# Patient Record
Sex: MALE | Race: WHITE | NOT HISPANIC OR LATINO | Employment: OTHER | URBAN - METROPOLITAN AREA
[De-identification: names, ages, dates, MRNs, and addresses within clinical notes are randomized per-mention and may not be internally consistent; named-entity substitution may affect disease eponyms.]

---

## 2023-09-13 ENCOUNTER — APPOINTMENT (OUTPATIENT)
Dept: OTHER | Facility: HOSPITAL | Age: 88
End: 2023-09-13

## 2023-09-13 ENCOUNTER — HOSPITAL ENCOUNTER (INPATIENT)
Facility: HOSPITAL | Age: 88
LOS: 3 days | Discharge: HOME | End: 2023-09-16
Attending: EMERGENCY MEDICINE | Admitting: INTERNAL MEDICINE

## 2023-09-13 DIAGNOSIS — Z74.09 IMPAIRED FUNCTIONAL MOBILITY, BALANCE, GAIT, AND ENDURANCE: ICD-10-CM

## 2023-09-13 DIAGNOSIS — R31.9 URINARY TRACT INFECTION WITH HEMATURIA, SITE UNSPECIFIED: ICD-10-CM

## 2023-09-13 DIAGNOSIS — N39.0 URINARY TRACT INFECTION WITH HEMATURIA, SITE UNSPECIFIED: ICD-10-CM

## 2023-09-13 DIAGNOSIS — R33.9 URINARY RETENTION: Primary | ICD-10-CM

## 2023-09-13 DIAGNOSIS — N28.89 RIGHT RENAL MASS: ICD-10-CM

## 2023-09-13 DIAGNOSIS — N17.9 ACUTE RENAL FAILURE, UNSPECIFIED ACUTE RENAL FAILURE TYPE: ICD-10-CM

## 2023-09-13 DIAGNOSIS — N28.89 RENAL MASS, RIGHT: ICD-10-CM

## 2023-09-13 LAB
ALBUMIN SERPL-MCNC: 3.6 G/DL (ref 3.5–5.2)
ALBUMIN/GLOB SERPL: 1.1 G/DL
ALP SERPL-CCNC: 145 U/L (ref 39–117)
ALT SERPL W P-5'-P-CCNC: 14 U/L (ref 1–41)
ANION GAP SERPL CALCULATED.3IONS-SCNC: 16 MMOL/L (ref 5–15)
ANION GAP SERPL CALCULATED.3IONS-SCNC: 17 MMOL/L (ref 5–15)
AST SERPL-CCNC: 20 U/L (ref 1–40)
BACTERIA UR QL AUTO: ABNORMAL /HPF
BASOPHILS # BLD AUTO: 0.04 10*3/MM3 (ref 0–0.2)
BASOPHILS NFR BLD AUTO: 0.3 % (ref 0–1.5)
BILIRUB SERPL-MCNC: 0.3 MG/DL (ref 0–1.2)
BILIRUB UR QL STRIP: NEGATIVE
BUN SERPL-MCNC: 71 MG/DL (ref 8–23)
BUN SERPL-MCNC: 73 MG/DL (ref 8–23)
BUN/CREAT SERPL: 12.8 (ref 7–25)
BUN/CREAT SERPL: 13.9 (ref 7–25)
CALCIUM SPEC-SCNC: 9.1 MG/DL (ref 8.2–9.6)
CALCIUM SPEC-SCNC: 9.4 MG/DL (ref 8.2–9.6)
CHLORIDE SERPL-SCNC: 84 MMOL/L (ref 98–107)
CHLORIDE SERPL-SCNC: 88 MMOL/L (ref 98–107)
CLARITY UR: ABNORMAL
CO2 SERPL-SCNC: 22 MMOL/L (ref 22–29)
CO2 SERPL-SCNC: 24 MMOL/L (ref 22–29)
COLOR UR: YELLOW
CREAT SERPL-MCNC: 5.1 MG/DL (ref 0.76–1.27)
CREAT SERPL-MCNC: 5.7 MG/DL (ref 0.76–1.27)
D-LACTATE SERPL-SCNC: 1.8 MMOL/L (ref 0.5–2)
DEPRECATED RDW RBC AUTO: 50.8 FL (ref 37–54)
EGFRCR SERPLBLD CKD-EPI 2021: 8.6 ML/MIN/1.73
EGFRCR SERPLBLD CKD-EPI 2021: 9.9 ML/MIN/1.73
EOSINOPHIL # BLD AUTO: 0.1 10*3/MM3 (ref 0–0.4)
EOSINOPHIL NFR BLD AUTO: 0.7 % (ref 0.3–6.2)
ERYTHROCYTE [DISTWIDTH] IN BLOOD BY AUTOMATED COUNT: 16.1 % (ref 12.3–15.4)
GLOBULIN UR ELPH-MCNC: 3.3 GM/DL
GLUCOSE SERPL-MCNC: 115 MG/DL (ref 65–99)
GLUCOSE SERPL-MCNC: 141 MG/DL (ref 65–99)
GLUCOSE UR STRIP-MCNC: NEGATIVE MG/DL
HCT VFR BLD AUTO: 33.4 % (ref 37.5–51)
HGB BLD-MCNC: 10.8 G/DL (ref 13–17.7)
HGB UR QL STRIP.AUTO: ABNORMAL
HYALINE CASTS UR QL AUTO: ABNORMAL /LPF
IMM GRANULOCYTES # BLD AUTO: 0.07 10*3/MM3 (ref 0–0.05)
IMM GRANULOCYTES NFR BLD AUTO: 0.5 % (ref 0–0.5)
KETONES UR QL STRIP: NEGATIVE
LEUKOCYTE ESTERASE UR QL STRIP.AUTO: ABNORMAL
LYMPHOCYTES # BLD AUTO: 0.74 10*3/MM3 (ref 0.7–3.1)
LYMPHOCYTES NFR BLD AUTO: 4.8 % (ref 19.6–45.3)
MCH RBC QN AUTO: 28.1 PG (ref 26.6–33)
MCHC RBC AUTO-ENTMCNC: 32.3 G/DL (ref 31.5–35.7)
MCV RBC AUTO: 87 FL (ref 79–97)
MONOCYTES # BLD AUTO: 0.9 10*3/MM3 (ref 0.1–0.9)
MONOCYTES NFR BLD AUTO: 5.9 % (ref 5–12)
NEUTROPHILS NFR BLD AUTO: 13.48 10*3/MM3 (ref 1.7–7)
NEUTROPHILS NFR BLD AUTO: 87.8 % (ref 42.7–76)
NITRITE UR QL STRIP: NEGATIVE
NRBC BLD AUTO-RTO: 0 /100 WBC (ref 0–0.2)
PH UR STRIP.AUTO: 5.5 [PH] (ref 5–8)
PLATELET # BLD AUTO: 321 10*3/MM3 (ref 140–450)
PMV BLD AUTO: 8.4 FL (ref 6–12)
POTASSIUM SERPL-SCNC: 5.4 MMOL/L (ref 3.5–5.2)
POTASSIUM SERPL-SCNC: 5.8 MMOL/L (ref 3.5–5.2)
PROT SERPL-MCNC: 6.9 G/DL (ref 6–8.5)
PROT UR QL STRIP: ABNORMAL
QT INTERVAL: 312 MS
QTC INTERVAL: 462 MS
RBC # BLD AUTO: 3.84 10*6/MM3 (ref 4.14–5.8)
RBC # UR STRIP: ABNORMAL /HPF
REF LAB TEST METHOD: ABNORMAL
SODIUM SERPL-SCNC: 125 MMOL/L (ref 136–145)
SODIUM SERPL-SCNC: 126 MMOL/L (ref 136–145)
SP GR UR STRIP: 1.02 (ref 1–1.03)
SQUAMOUS #/AREA URNS HPF: ABNORMAL /HPF
UROBILINOGEN UR QL STRIP: ABNORMAL
WBC # UR STRIP: ABNORMAL /HPF
WBC NRBC COR # BLD: 15.33 10*3/MM3 (ref 3.4–10.8)

## 2023-09-13 PROCEDURE — 87040 BLOOD CULTURE FOR BACTERIA: CPT | Performed by: EMERGENCY MEDICINE

## 2023-09-13 PROCEDURE — 51702 INSERT TEMP BLADDER CATH: CPT

## 2023-09-13 PROCEDURE — 25010000002 CEFTRIAXONE PER 250 MG: Performed by: EMERGENCY MEDICINE

## 2023-09-13 PROCEDURE — 94640 AIRWAY INHALATION TREATMENT: CPT

## 2023-09-13 PROCEDURE — 99285 EMERGENCY DEPT VISIT HI MDM: CPT

## 2023-09-13 PROCEDURE — 93005 ELECTROCARDIOGRAM TRACING: CPT | Performed by: EMERGENCY MEDICINE

## 2023-09-13 PROCEDURE — 81001 URINALYSIS AUTO W/SCOPE: CPT | Performed by: EMERGENCY MEDICINE

## 2023-09-13 PROCEDURE — 25010000002 CALCIUM GLUCONATE-NACL 1-0.675 GM/50ML-% SOLUTION: Performed by: INTERNAL MEDICINE

## 2023-09-13 PROCEDURE — 83605 ASSAY OF LACTIC ACID: CPT | Performed by: EMERGENCY MEDICINE

## 2023-09-13 PROCEDURE — 85025 COMPLETE CBC W/AUTO DIFF WBC: CPT | Performed by: EMERGENCY MEDICINE

## 2023-09-13 PROCEDURE — 63710000001 INSULIN REGULAR HUMAN PER 5 UNITS: Performed by: INTERNAL MEDICINE

## 2023-09-13 PROCEDURE — 87086 URINE CULTURE/COLONY COUNT: CPT | Performed by: EMERGENCY MEDICINE

## 2023-09-13 PROCEDURE — 80053 COMPREHEN METABOLIC PANEL: CPT | Performed by: EMERGENCY MEDICINE

## 2023-09-13 PROCEDURE — 36415 COLL VENOUS BLD VENIPUNCTURE: CPT

## 2023-09-13 RX ORDER — DEXTROSE MONOHYDRATE 25 G/50ML
25 INJECTION, SOLUTION INTRAVENOUS ONCE
Status: COMPLETED | OUTPATIENT
Start: 2023-09-13 | End: 2023-09-13

## 2023-09-13 RX ORDER — HYDROCODONE BITARTRATE AND ACETAMINOPHEN 5; 325 MG/1; MG/1
2 TABLET ORAL EVERY 6 HOURS PRN
COMMUNITY
Start: 2023-09-13

## 2023-09-13 RX ORDER — CALCIUM GLUCONATE 20 MG/ML
1000 INJECTION, SOLUTION INTRAVENOUS ONCE
Status: COMPLETED | OUTPATIENT
Start: 2023-09-13 | End: 2023-09-13

## 2023-09-13 RX ORDER — SODIUM CHLORIDE 0.9 % (FLUSH) 0.9 %
10 SYRINGE (ML) INJECTION AS NEEDED
Status: DISCONTINUED | OUTPATIENT
Start: 2023-09-13 | End: 2023-09-16 | Stop reason: HOSPADM

## 2023-09-13 RX ORDER — DONEPEZIL HYDROCHLORIDE 5 MG/1
5 TABLET, FILM COATED ORAL NIGHTLY
COMMUNITY
Start: 2023-07-07

## 2023-09-13 RX ORDER — LOSARTAN POTASSIUM 100 MG/1
100 TABLET ORAL DAILY
COMMUNITY
Start: 2023-07-21 | End: 2023-09-16 | Stop reason: HOSPADM

## 2023-09-13 RX ORDER — SODIUM CHLORIDE 9 MG/ML
125 INJECTION, SOLUTION INTRAVENOUS CONTINUOUS
Status: DISCONTINUED | OUTPATIENT
Start: 2023-09-13 | End: 2023-09-14 | Stop reason: SDUPTHER

## 2023-09-13 RX ORDER — MINOCYCLINE HYDROCHLORIDE 100 MG/1
100 CAPSULE ORAL DAILY
COMMUNITY
Start: 2023-09-06 | End: 2023-09-16 | Stop reason: HOSPADM

## 2023-09-13 RX ORDER — TAMSULOSIN HYDROCHLORIDE 0.4 MG/1
1 CAPSULE ORAL DAILY
COMMUNITY
Start: 2023-09-13

## 2023-09-13 RX ORDER — ALBUTEROL SULFATE 2.5 MG/3ML
5 SOLUTION RESPIRATORY (INHALATION) ONCE
Status: COMPLETED | OUTPATIENT
Start: 2023-09-13 | End: 2023-09-13

## 2023-09-13 RX ORDER — MEMANTINE HYDROCHLORIDE 10 MG/1
10 TABLET ORAL 2 TIMES DAILY
COMMUNITY
Start: 2023-06-23

## 2023-09-13 RX ADMIN — SODIUM CHLORIDE 500 ML: 9 INJECTION, SOLUTION INTRAVENOUS at 21:16

## 2023-09-13 RX ADMIN — ALBUTEROL SULFATE 5 MG: 2.5 SOLUTION RESPIRATORY (INHALATION) at 21:46

## 2023-09-13 RX ADMIN — SODIUM CHLORIDE 125 ML/HR: 9 INJECTION, SOLUTION INTRAVENOUS at 21:23

## 2023-09-13 RX ADMIN — SODIUM CHLORIDE 1000 MG: 900 INJECTION INTRAVENOUS at 20:54

## 2023-09-13 RX ADMIN — CALCIUM GLUCONATE 1000 MG: 20 INJECTION, SOLUTION INTRAVENOUS at 22:12

## 2023-09-13 RX ADMIN — DEXTROSE MONOHYDRATE 25 G: 25 INJECTION, SOLUTION INTRAVENOUS at 22:15

## 2023-09-13 RX ADMIN — SODIUM BICARBONATE 50 MEQ: 84 INJECTION INTRAVENOUS at 22:15

## 2023-09-13 RX ADMIN — INSULIN HUMAN 5 UNITS: 100 INJECTION, SOLUTION PARENTERAL at 22:12

## 2023-09-13 RX ADMIN — SODIUM ZIRCONIUM CYCLOSILICATE 10 G: 10 POWDER, FOR SUSPENSION ORAL at 22:11

## 2023-09-13 NOTE — Clinical Note
Level of Care: Telemetry [5]   Diagnosis: Acute renal failure [330676]   Admitting Physician: CATHERINE QURESHI [[id number]]   Attending Physician: CATHERINE QURESHI [[id number]]   Isolate for COVID?: No [0]   Bed Request Comments: tele   Certification: I Certify That Inpatient Hospital Services Are Medically Necessary For Greater Than 2 Midnights

## 2023-09-13 NOTE — ED PROVIDER NOTES
[place]    EMERGENCY DEPARTMENT ENCOUNTER      Pt Name: Logan Miranda  MRN: [id number]  YOB: 1928  Date of evaluation: 9/13/2023  Provider: Saleem Barba DO    CHIEF COMPLAINT       Chief Complaint   Patient presents with    Difficulty Urinating     HPI  Stated Reason for Visit: PT PRESENTS TO ER WITH COMPLAINT OF URINARY RETENTION. WALKER CATHETER PLACED YESTERDAY. REMOVED BY HOSPICE NURSE TODAY DUE TO PAIN THEN UNABLE TO PLACE NEW WALKER CATHETER. History Obtained From: patient;family     HISTORY OF PRESENT ILLNESS  (Location/Symptom, Timing/Onset, Context/Setting, Quality, Duration, Modifying Factors, Severity.)   Logan Miranda is a 89 y.o. male who presents to the emergency department with family secondary to concern for urinary obstruction.  Family notes there has been a recent extensive work-up at outside hospital at [facility] instead the as the patient was seen for hematuria which has been worsening over the last few days, requiring hospitalization, imaging which showed a large renal mass, concern for cancerous lesion.  They note he had a Walker catheter placed at that time which was continued to drain until they had 0 output over the last 30 hours.  Staff had attempted to remove the Walker and reinserted a new catheter this evening around 5 PM but they were unsuccessful after multiple attempts.  Instructed to come to [facility] for further evaluation.  Family notes that the patient has been told he has a large renal mass, likely cancerous in nature, this is a new diagnosis for the patient.  He notes abdominal fullness, Walker catheter placed meetly upon arrival to the ED room he notes improvement of his abdominal pain and discomfort.  Patient is not yet seen urologist or oncologist for further assessment.  Denies any chest pain, fever, chills.  He has been recently started on antibiotic for urinary tract infection, minocycline.      Nursing notes were reviewed.      PAST MEDICAL  HISTORY   No past medical history on file.      SURGICAL HISTORY     No past surgical history on file.      CURRENT MEDICATIONS       Current Facility-Administered Medications:     calcium gluconate 1000 Mg/50ml 0.675% NaCl IV SOLN, 1,000 mg, Intravenous, Once, Zakia, Pam G, DO    dextrose (D50W) (25 g/50 mL) IV injection 25 g, 25 g, Intravenous, Once, Zakia, Pam G, DO    insulin regular (humuLIN R,novoLIN R) injection 5 Units, 5 Units, Intravenous, Once, Zakia, Pam G, DO    sodium bicarbonate injection 8.4% 50 mEq, 50 mEq, Intravenous, Once, Zakia, Pam G, DO    Insert Peripheral IV, , , Once **AND** sodium chloride 0.9 % flush 10 mL, 10 mL, Intravenous, PRN, Saleem Barba,     sodium chloride 0.9 % infusion, 125 mL/hr, Intravenous, Continuous, Saleem Barba, DO, Last Rate: 125 mL/hr at 09/13/23 2123, 125 mL/hr at 09/13/23 2123    sodium zirconium cyclosilicate (LOKELMA) pack 10 g, 10 g, Oral, Once, Zakia, Pam G, DO    Current Outpatient Medications:     donepezil (ARICEPT) 5 MG tablet, Take 1 tablet by mouth Every Night., Disp: , Rfl:     HYDROcodone-acetaminophen (NORCO) 5-325 MG per tablet, Take 2 tablets by mouth Every 6 (Six) Hours As Needed for Mild Pain, Moderate Pain or Severe Pain., Disp: , Rfl:     losartan (COZAAR) 100 MG tablet, Take 1 tablet by mouth Daily., Disp: , Rfl:     memantine (NAMENDA) 10 MG tablet, Take 1 tablet by mouth 2 (Two) Times a Day., Disp: , Rfl:     minocycline (MINOCIN,DYNACIN) 100 MG capsule, Take 1 capsule by mouth Daily., Disp: , Rfl:     tamsulosin (FLOMAX) 0.4 MG capsule 24 hr capsule, 1 capsule Daily., Disp: , Rfl:     ALLERGIES     Patient has no known allergies.    FAMILY HISTORY     No family history on file.       SOCIAL HISTORY       Social History     Socioeconomic History    Marital status:          PHYSICAL EXAM    (up to 7 for level 4, 8 or more for level 5)     Vitals:    09/13/23 2131 09/13/23 2141 09/13/23 2146 09/13/23 2151    BP: 108/75 106/72  94/76   BP Location:       Patient Position:       Pulse: (!) 129 (!) 130 (!) 130 (!) 130   Resp:   20    Temp:       TempSrc:       SpO2: 96% 97% 96% 91%   Weight:       Height:           Physical Exam  General : Patient is pleasant, elderly, awake, alert, oriented, in no acute distress, nontoxic appearing  HEENT: Pupils are equally round, EOMI, conjunctivae clear  Cardiac: Heart tachycardic rate with regular rhythm  Lungs: Lungs are clear to auscultation  Abdomen: Abdomen is soft, nontender, nondistended.  No peritoneal signs examination, Curtis catheter in place with dark brown/reddish urine  Dermatology: Skin is warm and dry        DIAGNOSTIC RESULTS     EKG:  All EKGs are interpreted by the Emergency Department Physician who either signs or Co-signs this chart in the absence of a cardiologist.    ECG 12 Lead Tachycardia   Preliminary Result   Test Reason : Tachycardia   Blood Pressure :   */*   mmHG   Vent. Rate : 132 BPM     Atrial Rate : 133 BPM      P-R Int :   * ms          QRS Dur : 112 ms       QT Int : 312 ms       P-R-T Axes :   * 229  64 degrees      QTc Int : 462 ms      ** Suspect arm lead reversal, interpretation assumes no reversal   Supraventricular tachycardia   Septal infarct , age undetermined   Lateral infarct , age undetermined   Abnormal ECG   No previous ECGs available      Referred By:            Confirmed By:       ECG 12 Lead Electrolyte Imbalance    (Results Pending)       RADIOLOGY:     [] Radiologist's Report Reviewed:  CT Outside Films   Final Result        CT scan images from outside facility uploaded for review.            ED BEDSIDE ULTRASOUND:   Performed by ED Physician - none    LABS:    I have reviewed and interpreted all of the currently available lab results from this visit (if applicable):  Results for orders placed or performed during the hospital encounter of 09/13/23   Urinalysis With Microscopic If Indicated (No Culture) - Indwelling Urethral  Catheter    Specimen: Indwelling Urethral Catheter; Urine   Result Value Ref Range    Color, UA Yellow Yellow, Straw    Appearance, UA Cloudy (A) Clear    pH, UA 5.5 5.0 - 8.0    Specific Gravity, UA 1.016 1.001 - 1.030    Glucose, UA Negative Negative    Ketones, UA Negative Negative    Bilirubin, UA Negative Negative    Blood, UA Large (3+) (A) Negative    Protein,  mg/dL (2+) (A) Negative    Leuk Esterase, UA Small (1+) (A) Negative    Nitrite, UA Negative Negative    Urobilinogen, UA 0.2 E.U./dL 0.2 - 1.0 E.U./dL   Comprehensive Metabolic Panel    Specimen: Blood   Result Value Ref Range    Glucose 115 (H) 65 - 99 mg/dL    BUN 73 (H) 8 - 23 mg/dL    Creatinine 5.70 (H) 0.76 - 1.27 mg/dL    Sodium 125 (L) 136 - 145 mmol/L    Potassium 5.8 (H) 3.5 - 5.2 mmol/L    Chloride 84 (L) 98 - 107 mmol/L    CO2 24.0 22.0 - 29.0 mmol/L    Calcium 9.4 8.2 - 9.6 mg/dL    Total Protein 6.9 6.0 - 8.5 g/dL    Albumin 3.6 3.5 - 5.2 g/dL    ALT (SGPT) 14 1 - 41 U/L    AST (SGOT) 20 1 - 40 U/L    Alkaline Phosphatase 145 (H) 39 - 117 U/L    Total Bilirubin 0.3 0.0 - 1.2 mg/dL    Globulin 3.3 gm/dL    A/G Ratio 1.1 g/dL    BUN/Creatinine Ratio 12.8 7.0 - 25.0    Anion Gap 17.0 (H) 5.0 - 15.0 mmol/L    eGFR 8.6 (L) >60.0 mL/min/1.73   CBC Auto Differential    Specimen: Blood   Result Value Ref Range    WBC 15.33 (H) 3.40 - 10.80 10*3/mm3    RBC 3.84 (L) 4.14 - 5.80 10*6/mm3    Hemoglobin 10.8 (L) 13.0 - 17.7 g/dL    Hematocrit 33.4 (L) 37.5 - 51.0 %    MCV 87.0 79.0 - 97.0 fL    MCH 28.1 26.6 - 33.0 pg    MCHC 32.3 31.5 - 35.7 g/dL    RDW 16.1 (H) 12.3 - 15.4 %    RDW-SD 50.8 37.0 - 54.0 fl    MPV 8.4 6.0 - 12.0 fL    Platelets 321 140 - 450 10*3/mm3    Neutrophil % 87.8 (H) 42.7 - 76.0 %    Lymphocyte % 4.8 (L) 19.6 - 45.3 %    Monocyte % 5.9 5.0 - 12.0 %    Eosinophil % 0.7 0.3 - 6.2 %    Basophil % 0.3 0.0 - 1.5 %    Immature Grans % 0.5 0.0 - 0.5 %    Neutrophils, Absolute 13.48 (H) 1.70 - 7.00 10*3/mm3    Lymphocytes,  Absolute 0.74 0.70 - 3.10 10*3/mm3    Monocytes, Absolute 0.90 0.10 - 0.90 10*3/mm3    Eosinophils, Absolute 0.10 0.00 - 0.40 10*3/mm3    Basophils, Absolute 0.04 0.00 - 0.20 10*3/mm3    Immature Grans, Absolute 0.07 (H) 0.00 - 0.05 10*3/mm3    nRBC 0.0 0.0 - 0.2 /100 WBC   Lactic Acid, Plasma    Specimen: Blood   Result Value Ref Range    Lactate 1.8 0.5 - 2.0 mmol/L   Urinalysis, Microscopic Only - Indwelling Urethral Catheter    Specimen: Indwelling Urethral Catheter; Urine   Result Value Ref Range    RBC, UA Too Numerous to Count (A) None Seen, 0-2 /HPF    WBC, UA 6-12 (A) None Seen, 0-2 /HPF    Bacteria, UA None Seen None Seen, Trace /HPF    Squamous Epithelial Cells, UA 0-2 None Seen, 0-2 /HPF    Hyaline Casts, UA 0-6 0 - 6 /LPF    Methodology Automated Microscopy    ECG 12 Lead Tachycardia   Result Value Ref Range    QT Interval 312 ms    QTC Interval 462 ms        If labs were ordered, I independently reviewed the results and considered them in treating the patient.      EMERGENCY DEPARTMENT COURSE and DIFFERENTIAL DIAGNOSIS/MDM:   Vitals:  AS OF 22:13 EDT    BP - 94/76  HR - (!) 130  TEMP - 97.4 °F (36.3 °C) (Oral)  O2 SATS - 91%      Orders placed during this visit:  Orders Placed This Encounter   Procedures    Blood Culture - Blood,    Blood Culture - Blood,    Urine Culture - Urine,    CT Outside Films    Urinalysis With Microscopic If Indicated (No Culture) - Urine, Catheter    Comprehensive Metabolic Panel    CBC Auto Differential    Lactic Acid, Plasma    Urinalysis, Microscopic Only - Urine, Clean Catch    Basic Metabolic Panel    Continuous Bladder Irrigation    Insert 3-Way Urinary Catheter    Urinary Catheter Care    Assess Need for Indwelling Urinary Catheter - Follow Removal Protocol    May Irrigate Catheter    Vital Signs Every Hour and Per Hospital Policy Based on Patient Condition    Telemetry - Maintain IV Access    Telemetry - Place Orders & Notify Provider of Results When Patient  Experiences Acute Chest Pain, Dysrhythmia or Respiratory Distress    Continuous Pulse Oximetry    POC Glucose Q1H    ECG 12 Lead Tachycardia    ECG 12 Lead Electrolyte Imbalance    Insert Peripheral IV    CBC & Differential       All labs have been independently reviewed by me.  All radiology studies have been reviewed by me and the radiologist dictating the report.  All EKG's have been independently viewed and interpreted by me.      Discussion below represents my analysis of pertinent findings related to patient's condition, differential diagnosis, treatment plan and final disposition.    Differential diagnosis:  The differential diagnosis associated with the patient's presentation includes: Acute urinary retention, gross hematuria, renal cancer, kidney stone    Additional sources  Discussed/ obtained information from independent historians:   [x] Spouse  [] Parent  [x] Family member  [] Friend  [] EMS   [] Other:    External (non-ED) record review:   [] Inpatient record:   [] Office record:   [] Outpatient record:   [] Prior Outpatient labs:   [x] Prior Outpatient radiology:   [] Primary Care record:   [] Outside ED record:   [] Other:     Patient's care impacted by:   [] Diabetes  [] Hypertension  [] CHF  [] Hyperlipidemia  [] Coronary Artery Disease   [] COPD   [] Cancer   [] Tobacco Abuse   [] Substance Abuse    [] Other:     Care significantly affected by Social Determinants of Health (housing and economic circumstances, unemployment)    [] Yes     [x] No   If yes, Patient's care significantly limited by Social Determinants of Health including:   [] Inadequate housing   [] Low income   [] Alcoholism and drug addiction in family   [] Problems related to primary support group   [] Unemployment   [] Problems related to employment   [] Other Social Determinants of Health:       MEDICATIONS ADMINISTERED IN ED:  Medications   sodium chloride 0.9 % flush 10 mL (has no administration in time range)   sodium chloride  0.9 % infusion (125 mL/hr Intravenous New Bag 9/13/23 2123)   sodium zirconium cyclosilicate (LOKELMA) pack 10 g (has no administration in time range)   calcium gluconate 1000 Mg/50ml 0.675% NaCl IV SOLN (has no administration in time range)   insulin regular (humuLIN R,novoLIN R) injection 5 Units (has no administration in time range)   dextrose (D50W) (25 g/50 mL) IV injection 25 g (has no administration in time range)   sodium bicarbonate injection 8.4% 50 mEq (has no administration in time range)   cefTRIAXone (ROCEPHIN) 1000 mg/100 mL 0.9% NS (MBP) (0 mg Intravenous Stopped 9/13/23 2131)   sodium chloride 0.9 % bolus 500 mL (500 mL Intravenous New Bag 9/13/23 2116)   albuterol (PROVENTIL) nebulizer solution 0.083% 2.5 mg/3mL (5 mg Nebulization Given 9/13/23 2146)              This is a pleasant 89-year-old male with a recent diagnosis of large renal mass with hematuria presented secondary to concerns for urinary retention, obstruction unable to reinsert Curtis catheter at his outside facility.  On arrival we did place a Curtis catheter with successful drainage of dark brown-reddish urine.  Patient CT scan images are uploaded, reviewed by myself, extensive right-sided renal mass is noted.  The patient was feeling better after the Curtis catheter insertion, he did have an elevated heart rate, likely from dehydration, electrolyte abnormalities.  He was started on IV fluids, potassium is elevated at 5.8, creatinine of 5.70 with a BUN of 73.  He was given hyperkalemia treatment and therapies, following his BMP closely.  We will cover for urinary tract infection given his recent instrumentation, white count of 15.3.  I did discuss the case with urologist on-call, Dr. Thomas, appreciate his input.  Recommends that the Curtis catheter should remain, fluid resuscitate, plan for admission, urology will consult in the morning.  Patient family are updated on the current findings, plan of care for admission for further  neurological nephrology evaluation.  Family notes patient is DNR, does not want any life-prolonging treatments or any extensive or invasive therapies or chemoradiation or surgical intervention.  General continue with IV fluids, antibiotics.  We will plan for admission, case discussed with hospitalist, Dr. Koehler, for admission.        PROCEDURES:  Procedures    CRITICAL CARE TIME    Total Critical Care time was 0 minutes, excluding separately reportable procedures.   There was a high probability of clinically significant/life threatening deterioration in the patient's condition which required my urgent intervention.      FINAL IMPRESSION      1. Urinary retention    2. Urinary tract infection with hematuria, site unspecified    3. Right renal mass    4. Acute renal failure, unspecified acute renal failure type          DISPOSITION/PLAN     ED Disposition       ED Disposition   Decision to Admit    Condition   --    Comment   --                   Comment: Please note this report has been produced using speech recognition software.      Saleem Barba DO  Attending Emergency Physician         Saleem Barba DO  09/13/23 [id number]

## 2023-09-14 PROBLEM — N28.89 RENAL MASS, RIGHT: Status: ACTIVE | Noted: 2023-09-14

## 2023-09-14 PROBLEM — F03.90 DEMENTIA: Status: ACTIVE | Noted: 2023-09-14

## 2023-09-14 PROBLEM — E87.1 HYPONATREMIA: Status: ACTIVE | Noted: 2023-09-14

## 2023-09-14 PROBLEM — I10 ESSENTIAL HYPERTENSION: Status: ACTIVE | Noted: 2023-09-14

## 2023-09-14 PROBLEM — E87.5 HYPERKALEMIA: Status: ACTIVE | Noted: 2023-09-14

## 2023-09-14 PROBLEM — R33.8 ACUTE URINARY RETENTION: Status: ACTIVE | Noted: 2023-09-14

## 2023-09-14 LAB
ANION GAP SERPL CALCULATED.3IONS-SCNC: 12 MMOL/L (ref 5–15)
ANION GAP SERPL CALCULATED.3IONS-SCNC: 13 MMOL/L (ref 5–15)
ANION GAP SERPL CALCULATED.3IONS-SCNC: 14 MMOL/L (ref 5–15)
ATMOSPHERIC PRESS: ABNORMAL MM[HG]
BACTERIA SPEC AEROBE CULT: NO GROWTH
BASE EXCESS BLDV CALC-SCNC: 0.7 MMOL/L (ref -2–2)
BASOPHILS # BLD AUTO: 0.02 10*3/MM3 (ref 0–0.2)
BASOPHILS NFR BLD AUTO: 0.2 % (ref 0–1.5)
BDY SITE: ABNORMAL
BODY TEMPERATURE: 37 C
BUN SERPL-MCNC: 58 MG/DL (ref 8–23)
BUN SERPL-MCNC: 61 MG/DL (ref 8–23)
BUN SERPL-MCNC: 65 MG/DL (ref 8–23)
BUN/CREAT SERPL: 15.1 (ref 7–25)
BUN/CREAT SERPL: 18.9 (ref 7–25)
BUN/CREAT SERPL: 19.1 (ref 7–25)
CALCIUM SPEC-SCNC: 8.8 MG/DL (ref 8.2–9.6)
CALCIUM SPEC-SCNC: 9 MG/DL (ref 8.2–9.6)
CALCIUM SPEC-SCNC: 9.2 MG/DL (ref 8.2–9.6)
CHLORIDE SERPL-SCNC: 93 MMOL/L (ref 98–107)
CHLORIDE SERPL-SCNC: 95 MMOL/L (ref 98–107)
CHLORIDE SERPL-SCNC: 96 MMOL/L (ref 98–107)
CO2 BLDA-SCNC: 28.4 MMOL/L (ref 22–33)
CO2 SERPL-SCNC: 24 MMOL/L (ref 22–29)
CO2 SERPL-SCNC: 24 MMOL/L (ref 22–29)
CO2 SERPL-SCNC: 25 MMOL/L (ref 22–29)
COHGB MFR BLD: 1.2 %
CREAT SERPL-MCNC: 3.07 MG/DL (ref 0.76–1.27)
CREAT SERPL-MCNC: 3.2 MG/DL (ref 0.76–1.27)
CREAT SERPL-MCNC: 4.31 MG/DL (ref 0.76–1.27)
CREAT UR-MCNC: 71.2 MG/DL
DEPRECATED RDW RBC AUTO: 48.9 FL (ref 37–54)
EGFRCR SERPLBLD CKD-EPI 2021: 12 ML/MIN/1.73
EGFRCR SERPLBLD CKD-EPI 2021: 17.2 ML/MIN/1.73
EGFRCR SERPLBLD CKD-EPI 2021: 18 ML/MIN/1.73
EOSINOPHIL # BLD AUTO: 0.12 10*3/MM3 (ref 0–0.4)
EOSINOPHIL NFR BLD AUTO: 1.1 % (ref 0.3–6.2)
EOSINOPHIL SPEC QL MICRO: 0 % EOS/100 CELLS (ref 0–0)
EPAP: 0
ERYTHROCYTE [DISTWIDTH] IN BLOOD BY AUTOMATED COUNT: 15.9 % (ref 12.3–15.4)
GLUCOSE SERPL-MCNC: 102 MG/DL (ref 65–99)
GLUCOSE SERPL-MCNC: 116 MG/DL (ref 65–99)
GLUCOSE SERPL-MCNC: 90 MG/DL (ref 65–99)
HCO3 BLDV-SCNC: 26.9 MMOL/L (ref 22–28)
HCT VFR BLD AUTO: 27.8 % (ref 37.5–51)
HGB BLD-MCNC: 9.2 G/DL (ref 13–17.7)
HGB BLDA-MCNC: 9.7 G/DL (ref 13.5–17.5)
IMM GRANULOCYTES # BLD AUTO: 0.03 10*3/MM3 (ref 0–0.05)
IMM GRANULOCYTES NFR BLD AUTO: 0.3 % (ref 0–0.5)
INHALED O2 CONCENTRATION: 21 %
IPAP: 0
LYMPHOCYTES # BLD AUTO: 0.97 10*3/MM3 (ref 0.7–3.1)
LYMPHOCYTES NFR BLD AUTO: 9.2 % (ref 19.6–45.3)
MAGNESIUM SERPL-MCNC: 2.4 MG/DL (ref 1.7–2.3)
MCH RBC QN AUTO: 28 PG (ref 26.6–33)
MCHC RBC AUTO-ENTMCNC: 33.1 G/DL (ref 31.5–35.7)
MCV RBC AUTO: 84.5 FL (ref 79–97)
METHGB BLD QL: 0.8 %
MODALITY: ABNORMAL
MONOCYTES # BLD AUTO: 0.71 10*3/MM3 (ref 0.1–0.9)
MONOCYTES NFR BLD AUTO: 6.7 % (ref 5–12)
NEUTROPHILS NFR BLD AUTO: 8.67 10*3/MM3 (ref 1.7–7)
NEUTROPHILS NFR BLD AUTO: 82.5 % (ref 42.7–76)
NRBC BLD AUTO-RTO: 0 /100 WBC (ref 0–0.2)
OSMOLALITY SERPL: 297 MOSM/KG (ref 275–295)
OSMOLALITY UR: 393 MOSM/KG (ref 300–1100)
OXYHGB MFR BLDV: 29.9 %
PAW @ PEAK INSP FLOW SETTING VENT: 0 CMH2O
PCO2 BLDV: 49.4 MM HG (ref 41–51)
PH BLDV: 7.34 PH UNITS (ref 7.31–7.41)
PLATELET # BLD AUTO: 260 10*3/MM3 (ref 140–450)
PMV BLD AUTO: 8.6 FL (ref 6–12)
PO2 BLDV: 22.2 MM HG (ref 27–53)
POTASSIUM SERPL-SCNC: 4.7 MMOL/L (ref 3.5–5.2)
POTASSIUM SERPL-SCNC: 5.1 MMOL/L (ref 3.5–5.2)
POTASSIUM SERPL-SCNC: 5.4 MMOL/L (ref 3.5–5.2)
QT INTERVAL: 354 MS
QTC INTERVAL: 428 MS
RBC # BLD AUTO: 3.29 10*6/MM3 (ref 4.14–5.8)
SODIUM SERPL-SCNC: 130 MMOL/L (ref 136–145)
SODIUM SERPL-SCNC: 133 MMOL/L (ref 136–145)
SODIUM UR-SCNC: 42 MMOL/L
TOTAL RATE: 0 BREATHS/MINUTE
WBC NRBC COR # BLD: 10.52 10*3/MM3 (ref 3.4–10.8)

## 2023-09-14 PROCEDURE — 97162 PT EVAL MOD COMPLEX 30 MIN: CPT

## 2023-09-14 PROCEDURE — 93005 ELECTROCARDIOGRAM TRACING: CPT | Performed by: INTERNAL MEDICINE

## 2023-09-14 PROCEDURE — 99221 1ST HOSP IP/OBS SF/LOW 40: CPT | Performed by: NURSE PRACTITIONER

## 2023-09-14 PROCEDURE — 80048 BASIC METABOLIC PNL TOTAL CA: CPT | Performed by: NURSE PRACTITIONER

## 2023-09-14 PROCEDURE — 84295 ASSAY OF SERUM SODIUM: CPT | Performed by: INTERNAL MEDICINE

## 2023-09-14 PROCEDURE — 87205 SMEAR GRAM STAIN: CPT | Performed by: INTERNAL MEDICINE

## 2023-09-14 PROCEDURE — 80048 BASIC METABOLIC PNL TOTAL CA: CPT | Performed by: INTERNAL MEDICINE

## 2023-09-14 PROCEDURE — 97530 THERAPEUTIC ACTIVITIES: CPT

## 2023-09-14 PROCEDURE — 82805 BLOOD GASES W/O2 SATURATION: CPT

## 2023-09-14 PROCEDURE — 83735 ASSAY OF MAGNESIUM: CPT | Performed by: NURSE PRACTITIONER

## 2023-09-14 PROCEDURE — 85025 COMPLETE CBC W/AUTO DIFF WBC: CPT | Performed by: NURSE PRACTITIONER

## 2023-09-14 PROCEDURE — 83930 ASSAY OF BLOOD OSMOLALITY: CPT | Performed by: INTERNAL MEDICINE

## 2023-09-14 PROCEDURE — 97166 OT EVAL MOD COMPLEX 45 MIN: CPT

## 2023-09-14 PROCEDURE — 82570 ASSAY OF URINE CREATININE: CPT | Performed by: INTERNAL MEDICINE

## 2023-09-14 PROCEDURE — 25010000002 CEFTRIAXONE PER 250 MG: Performed by: NURSE PRACTITIONER

## 2023-09-14 PROCEDURE — 83935 ASSAY OF URINE OSMOLALITY: CPT | Performed by: INTERNAL MEDICINE

## 2023-09-14 PROCEDURE — 93010 ELECTROCARDIOGRAM REPORT: CPT | Performed by: INTERNAL MEDICINE

## 2023-09-14 PROCEDURE — 84300 ASSAY OF URINE SODIUM: CPT | Performed by: INTERNAL MEDICINE

## 2023-09-14 RX ORDER — POLYETHYLENE GLYCOL 3350 17 G/17G
17 POWDER, FOR SOLUTION ORAL DAILY PRN
Status: DISCONTINUED | OUTPATIENT
Start: 2023-09-14 | End: 2023-09-16 | Stop reason: HOSPADM

## 2023-09-14 RX ORDER — LOSARTAN POTASSIUM 50 MG/1
100 TABLET ORAL DAILY
Status: DISCONTINUED | OUTPATIENT
Start: 2023-09-14 | End: 2023-09-14

## 2023-09-14 RX ORDER — HYDROCODONE BITARTRATE AND ACETAMINOPHEN 5; 325 MG/1; MG/1
2 TABLET ORAL EVERY 6 HOURS PRN
Status: DISCONTINUED | OUTPATIENT
Start: 2023-09-14 | End: 2023-09-16 | Stop reason: HOSPADM

## 2023-09-14 RX ORDER — DONEPEZIL HYDROCHLORIDE 5 MG/1
5 TABLET, FILM COATED ORAL NIGHTLY
Status: DISCONTINUED | OUTPATIENT
Start: 2023-09-14 | End: 2023-09-16 | Stop reason: HOSPADM

## 2023-09-14 RX ORDER — MEMANTINE HYDROCHLORIDE 10 MG/1
10 TABLET ORAL 2 TIMES DAILY
Status: DISCONTINUED | OUTPATIENT
Start: 2023-09-14 | End: 2023-09-16 | Stop reason: HOSPADM

## 2023-09-14 RX ORDER — DEXTROSE AND SODIUM CHLORIDE 5; .45 G/100ML; G/100ML
75 INJECTION, SOLUTION INTRAVENOUS CONTINUOUS
Status: DISCONTINUED | OUTPATIENT
Start: 2023-09-14 | End: 2023-09-15

## 2023-09-14 RX ORDER — SENNOSIDES A AND B 8.6 MG/1
1 TABLET, FILM COATED ORAL 2 TIMES DAILY
COMMUNITY

## 2023-09-14 RX ORDER — SENNOSIDES A AND B 8.6 MG/1
1 TABLET, FILM COATED ORAL 2 TIMES DAILY
Status: DISCONTINUED | OUTPATIENT
Start: 2023-09-14 | End: 2023-09-14 | Stop reason: SDUPTHER

## 2023-09-14 RX ORDER — TAMSULOSIN HYDROCHLORIDE 0.4 MG/1
0.4 CAPSULE ORAL DAILY
Status: DISCONTINUED | OUTPATIENT
Start: 2023-09-14 | End: 2023-09-16 | Stop reason: HOSPADM

## 2023-09-14 RX ORDER — AMOXICILLIN 250 MG
2 CAPSULE ORAL 2 TIMES DAILY
Status: DISCONTINUED | OUTPATIENT
Start: 2023-09-14 | End: 2023-09-16 | Stop reason: HOSPADM

## 2023-09-14 RX ORDER — SODIUM CHLORIDE, SODIUM LACTATE, POTASSIUM CHLORIDE, CALCIUM CHLORIDE 600; 310; 30; 20 MG/100ML; MG/100ML; MG/100ML; MG/100ML
100 INJECTION, SOLUTION INTRAVENOUS CONTINUOUS
Status: DISCONTINUED | OUTPATIENT
Start: 2023-09-14 | End: 2023-09-14

## 2023-09-14 RX ORDER — ASPIRIN 81 MG/1
81 TABLET, CHEWABLE ORAL DAILY
Status: DISCONTINUED | OUTPATIENT
Start: 2023-09-14 | End: 2023-09-16 | Stop reason: HOSPADM

## 2023-09-14 RX ORDER — BISACODYL 10 MG
10 SUPPOSITORY, RECTAL RECTAL DAILY PRN
Status: DISCONTINUED | OUTPATIENT
Start: 2023-09-14 | End: 2023-09-16 | Stop reason: HOSPADM

## 2023-09-14 RX ORDER — BISACODYL 5 MG/1
5 TABLET, DELAYED RELEASE ORAL DAILY PRN
Status: DISCONTINUED | OUTPATIENT
Start: 2023-09-14 | End: 2023-09-16 | Stop reason: HOSPADM

## 2023-09-14 RX ORDER — NITROGLYCERIN 0.4 MG/1
0.4 TABLET SUBLINGUAL
Status: DISCONTINUED | OUTPATIENT
Start: 2023-09-14 | End: 2023-09-16 | Stop reason: HOSPADM

## 2023-09-14 RX ORDER — ASPIRIN 81 MG/1
81 TABLET, CHEWABLE ORAL DAILY
COMMUNITY

## 2023-09-14 RX ADMIN — SODIUM CHLORIDE, POTASSIUM CHLORIDE, SODIUM LACTATE AND CALCIUM CHLORIDE 100 ML/HR: 600; 310; 30; 20 INJECTION, SOLUTION INTRAVENOUS at 03:02

## 2023-09-14 RX ADMIN — SENNOSIDES AND DOCUSATE SODIUM 2 TABLET: 8.6; 5 TABLET ORAL at 20:27

## 2023-09-14 RX ADMIN — DONEPEZIL HYDROCHLORIDE 5 MG: 5 TABLET, FILM COATED ORAL at 03:06

## 2023-09-14 RX ADMIN — DONEPEZIL HYDROCHLORIDE 5 MG: 5 TABLET, FILM COATED ORAL at 20:27

## 2023-09-14 RX ADMIN — ASPIRIN 81 MG: 81 TABLET, CHEWABLE ORAL at 09:55

## 2023-09-14 RX ADMIN — SENNOSIDES AND DOCUSATE SODIUM 2 TABLET: 8.6; 5 TABLET ORAL at 09:55

## 2023-09-14 RX ADMIN — TAMSULOSIN HYDROCHLORIDE 0.4 MG: 0.4 CAPSULE ORAL at 09:55

## 2023-09-14 RX ADMIN — SODIUM ZIRCONIUM CYCLOSILICATE 10 G: 10 POWDER, FOR SUSPENSION ORAL at 14:28

## 2023-09-14 RX ADMIN — DEXTROSE AND SODIUM CHLORIDE 75 ML/HR: 5; 450 INJECTION, SOLUTION INTRAVENOUS at 13:23

## 2023-09-14 RX ADMIN — SODIUM CHLORIDE 1000 MG: 900 INJECTION INTRAVENOUS at 20:27

## 2023-09-14 RX ADMIN — MEMANTINE 10 MG: 10 TABLET ORAL at 20:27

## 2023-09-14 RX ADMIN — MEMANTINE 10 MG: 10 TABLET ORAL at 09:55

## 2023-09-14 NOTE — THERAPY EVALUATION
Patient Name: Logan Miranda  : 1928    MRN: [id number]                              Today's Date: 2023       Admit Date: 2023    Visit Dx:     ICD-10-CM ICD-9-CM   1. Urinary retention  R33.9 788.20   2. Urinary tract infection with hematuria, site unspecified  N39.0 599.0    R31.9 599.70   3. Right renal mass  N28.89 593.9   4. Acute renal failure, unspecified acute renal failure type  N17.9 584.9   5. Impaired functional mobility, balance, gait, and endurance  Z74.09 V49.89     Patient Active Problem List   Diagnosis    Acute renal failure    Acute urinary retention    Hyperkalemia    Renal mass, right    Dementia    Essential hypertension    Hyponatremia     Past Medical History:   Diagnosis Date    Cancer      History reviewed. No pertinent surgical history.   General Information       Row Name 23 1539          OT Time and Intention    Document Type evaluation  -GLADYS     Mode of Treatment occupational therapy  -       Row Name 23 153          General Information    Prior Level of Function min assist:;ADL's;independent:;bed mobility;transfer  Pt has assist for bathing  -GLADYS     Existing Precautions/Restrictions fall;orthostatic hypotension;other (see comments)  hx dementia, hx falls; Curtis catheter  -GLADYS     Barriers to Rehab medically complex;previous functional deficit  Receiving hospice care at home  -GLADYS       Row Name 23 153          Occupational Profile    Environmental Supports and Barriers (Occupational Profile) Lives with spouse; ambulates using standard walker; has shower chair, BSC  -GLADYS       Row Name 23 153          Living Environment    People in Home spouse  -GLADYS       Row Name 23 1539          Stairs Within Home, Primary    Number of Stairs, Within Home, Primary none  -GLADYS       Row Name 23 153          Cognition    Orientation Status (Cognition) oriented x 4  -GLADYS       Row Name 23 153          Safety Issues, Functional Mobility     "Safety Issues Affecting Function (Mobility) awareness of need for assistance;insight into deficits/self-awareness;judgment;problem-solving;safety precaution awareness;safety precautions follow-through/compliance;sequencing abilities  -     Impairments Affecting Function (Mobility) balance;cognition;endurance/activity tolerance;motor planning;strength;visual/perceptual  -     Cognitive Impairments, Mobility Safety/Performance awareness, need for assistance;insight into deficits/self-awareness;judgment;problem-solving/reasoning;safety precaution awareness;safety precaution follow-through;sequencing abilities  -               User Key  (r) = Recorded By, (t) = Taken By, (c) = Cosigned By      Initials Name Provider Type    GLADYS Marilin Coelho OT Occupational Therapist                     Mobility/ADL's       Row Name 09/14/23 1545          Bed Mobility    Bed Mobility supine-sit  -     Supine-Sit [place] (Bed Mobility) standby assist  -     Assistive Device (Bed Mobility) head of bed elevated;bed rails  -[facility] Name 09/14/23 1545          Transfers    Transfers sit-stand transfer;bed-chair transfer  -     Comment, (Transfers) Pt reported \"wooziness\" with position change; BP 78/50 in static standing  -[facility] Name 09/14/23 1545          Bed-Chair Transfer    Bed-Chair [place] (Transfers) contact guard;2 person assist;verbal cues  -     Assistive Device (Bed-Chair Transfers) walker, front-wheeled  -     Comment, (Bed-Chair Transfer) cues for sequencing  -[facility] Name 09/14/23 1545          Sit-Stand Transfer    Sit-Stand [place] (Transfers) contact guard;verbal cues  -     Assistive Device (Sit-Stand Transfers) walker, 4-wheeled  -       Row Name 09/14/23 1545          Functional Mobility    Functional Mobility- Ind. Level unable to perform  -       Row Name 09/14/23 1545          Activities of Daily Living    BADL Assessment/Intervention lower body dressing;grooming  " -       Row Name 09/14/23 1545          Lower Body Dressing Assessment/Training    [place] Level (Lower Body Dressing) doff;don;socks;dependent (less than 25% patient effort)  -     Position (Lower Body Dressing) edge of bed sitting  -       Row Name 09/14/23 1545          Grooming Assessment/Training    [place] Level (Grooming) oral care regimen;wash face, hands;verbal cues;nonverbal cues (demo/gesture);supervision  -GLADYS     Position (Grooming) supported sitting  -GLADYS     Comment, (Grooming) cues to locate items on tray table  -               User Key  (r) = Recorded By, (t) = Taken By, (c) = Cosigned By      Initials Name Provider Type    GLADYS Marilin Coelho OT Occupational Therapist                   Obj/Interventions       [facility] Name 09/14/23 1547          Sensory Assessment (Somatosensory)    Sensory Assessment (Somatosensory) UE sensation intact  -[facility] Name 09/14/23 1547          Vision Assessment/Intervention    Visual Impairment/Limitations corrective lenses full-time  -[facility] Name 09/14/23 1547          Range of Motion Comprehensive    General Range of Motion bilateral upper extremity ROM WFL  -[facility] Name 09/14/23 1547          Strength Comprehensive (MMT)    Comment, General Manual Muscle Testing (MMT) Assessment BUE's grossly 4/5  -[facility] Name 09/14/23 1547          Balance    Balance Assessment sitting static balance;standing static balance;standing dynamic balance  -     Static Sitting Balance independent  -GLADYS     Position, Sitting Balance unsupported;sitting edge of bed  -     Static Standing Balance contact guard;1-person assist  -     Dynamic Standing Balance contact guard;2-person assist;verbal cues;non-verbal cues (demo/gesture)  -GLADYS     Position/Device Used, Standing Balance unsupported  -     Balance Interventions standing;occupation based/functional task;weight shifting activity  -     Comment, Balance CGAx2 to take steps over chair from EOB  -GLADYS                User Key  (r) = Recorded By, (t) = Taken By, (c) = Cosigned By      Initials Name Provider Type    Marilin Campbell OT Occupational Therapist                   Goals/Plan       Row Name 09/14/23 0733          Transfer Goal 1 (OT)    Activity/Assistive Device (Transfer Goal 1, OT) sit-to-stand/stand-to-sit;toilet;walker, rolling  -GLADYS     [place] Level/Cues Needed (Transfer Goal 1, OT) contact guard required  -GLADYS     Time Frame (Transfer Goal 1, OT) long term goal (LTG);by discharge  -GLADYS     Progress/Outcome (Transfer Goal 1, OT) new goal  -GLADYS       Row Name 09/14/23 6885          Dressing Goal 1 (OT)    Activity/Device (Dressing Goal 1, OT) lower body dressing  -GLADYS     [place]/Cues Needed (Dressing Goal 1, OT) moderate assist (50-74% patient effort)  -GLADYS     Time Frame (Dressing Goal 1, OT) long term goal (LTG);by discharge  -GLADYS     Progress/Outcome (Dressing Goal 1, OT) new goal  -GLADYS       Row Name 09/14/23 1964          Toileting Goal 1 (OT)    Activity/Device (Toileting Goal 1, OT) adjust/manage clothing;perform perineal hygiene;commode;grab bar/safety frame  -GLADYS     [place] Level/Cues Needed (Toileting Goal 1, OT) minimum assist (75% or more patient effort)  -GLADYS     Time Frame (Toileting Goal 1, OT) long term goal (LTG);by discharge  -GLADYS     Progress/Outcome (Toileting Goal 1, OT) new goal  -GLADYS       Row Name 09/14/23 8203          Therapy Assessment/Plan (OT)    Planned Therapy Interventions (OT) activity tolerance training;BADL retraining;functional balance retraining;occupation/activity based interventions;patient/caregiver education/training;ROM/therapeutic exercise;strengthening exercise;transfer/mobility retraining  -GLADYS               User Key  (r) = Recorded By, (t) = Taken By, (c) = Cosigned By      Initials Name Provider Type    Marilin Campbell OT Occupational Therapist                   Clinical Impression       Row Name 09/14/23 4676          Pain Assessment     Pretreatment Pain Rating 0/10 - no pain  -GLADYS     Posttreatment Pain Rating 0/10 - no pain  -       Row Name 09/14/23 1549          Plan of Care Review    Plan of Care Reviewed With patient;spouse;son  -     Outcome Evaluation OT eval completed. Pt presents with generalized weakness, decreased activity tolerance, and dizziness with positional changes impacting ADL's. Pt Dep for donning socks and pajama pants at EOB, CGA for functional transfers, VERDE for grooming tasks seated in chair. IP OT services warranted to progress self-care and safety. Recommend HHOT at discharge.  -       Row Name 09/14/23 1549          Therapy Assessment/Plan (OT)    Rehab Potential (OT) good, to achieve stated therapy goals  -     Criteria for Skilled Therapeutic Interventions Met (OT) yes;meets criteria;skilled treatment is necessary  -     Therapy Frequency (OT) daily  -       Row Name 09/14/23 1549          Therapy Plan Review/Discharge Plan (OT)    Anticipated Discharge Disposition (OT) home with 24/7 care;home with home health  -       Row Name 09/14/23 1549          Vital Signs    Pre Systolic BP Rehab 137  seated EOB  -GLADYS     Pre Treatment Diastolic BP 70  -GLADYS     Intra Systolic BP Rehab 78  static standing  -GLADYS     Intra Treatment Diastolic BP 50  -GLADYS     Post Systolic BP Rehab 138  -GLADYS     Post Treatment Diastolic BP 70  -GLADYS     Pretreatment Heart Rate (beats/min) 80  -GLADYS     Posttreatment Heart Rate (beats/min) 89  -GLADYS     Pre SpO2 (%) 96  -GLADYS     O2 Delivery Pre Treatment room air  -GLADYS     O2 Delivery Intra Treatment room air  -GLADYS     Post SpO2 (%) 95  -GLADYS     O2 Delivery Post Treatment room air  -GLADYS     Pre Patient Position Sitting  -GLADYS     Intra Patient Position Standing  -GLADYS     Post Patient Position Sitting  -GLADYS       Row Name 09/14/23 1549          Positioning and Restraints    Pre-Treatment Position in bed  -GLADYS     Post Treatment Position chair  -GLADYS     In Chair notified nsg;reclined;call light within  reach;encouraged to call for assist;exit alarm on;with family/caregiver;waffle cushion;legs elevated;heels elevated  -GLADYS               User Key  (r) = Recorded By, (t) = Taken By, (c) = Cosigned By      Initials Name Provider Type    Marilin Campbell, SHANNON Occupational Therapist                   Outcome Measures       Row Name 09/14/23 1556          How much help from another is currently needed...    Putting on and taking off regular lower body clothing? 2  -GLADYS     Bathing (including washing, rinsing, and drying) 2  -GLADYS     Toileting (which includes using toilet bed pan or urinal) 1  -GLADYS     Putting on and taking off regular upper body clothing 3  -GLADYS     Taking care of personal grooming (such as brushing teeth) 3  -GLADYS     Eating meals 3  -GLADYS     AM-PAC 6 Clicks Score (OT) 14  -GLADYS       Row Name 09/14/23 1531 09/14/23 0955       How much help from another person do you currently need...    Turning from your back to your side while in flat bed without using bedrails? 4  -SD 3  -DF    Moving from lying on back to sitting on the side of a flat bed without bedrails? 4  -SD 3  -DF    Moving to and from a bed to a chair (including a wheelchair)? 3  -SD 3  -DF    Standing up from a chair using your arms (e.g., wheelchair, bedside chair)? 3  -SD 3  -DF    Climbing 3-5 steps with a railing? 2  -SD 2  -DF    To walk in hospital room? 3  -SD 2  -DF    AM-PAC 6 Clicks Score (PT) 19  -SD 16  -DF    Highest level of mobility 6 --> Walked 10 steps or more  -SD 5 --> Static standing  -DF      Row Name 09/14/23 1556 09/14/23 1531       Functional Assessment    Outcome Measure Options AM-PAC 6 Clicks Daily Activity (OT)  -GLADYS AM-PAC 6 Clicks Basic Mobility (PT)  -SD              User Key  (r) = Recorded By, (t) = Taken By, (c) = Cosigned By      Initials Name Provider Type    Yruidia Campbell, PT Physical Therapist    DF Rita Irby RN Registered Nurse    Marilin Campbell OT Occupational Therapist                     Occupational Therapy Education       Title: PT OT SLP Therapies (In Progress)       Topic: Occupational Therapy (In Progress)       Point: ADL training (Done)       Description:   Instruct learner(s) on proper safety adaptation and remediation techniques during self care or transfers.   Instruct in proper use of assistive devices.                  Learning Progress Summary             Patient Acceptance, E,D, VU by GLADYS at 9/14/2023 1557    Comment: OT POC; LBD with Curtis catheter; BADL's   Family Acceptance, E,D, VU by GLADYS at 9/14/2023 1557    Comment: OT POC; LBD with Curtis catheter; BADL's                         Point: Home exercise program (Not Started)       Description:   Instruct learner(s) on appropriate technique for monitoring, assisting and/or progressing therapeutic exercises/activities.                  Learner Progress:  Not documented in this visit.              Point: Precautions (Done)       Description:   Instruct learner(s) on prescribed precautions during self-care and functional transfers.                  Learning Progress Summary             Patient Acceptance, E,D, VU by GLADYS at 9/14/2023 1557    Comment: OT POC; LBD with Curtis catheter; BADL's   Family Acceptance, E,D, VU by GLADYS at 9/14/2023 1557    Comment: OT POC; LBD with Curtis catheter; BADL's                         Point: Body mechanics (Not Started)       Description:   Instruct learner(s) on proper positioning and spine alignment during self-care, functional mobility activities and/or exercises.                  Learner Progress:  Not documented in this visit.                              User Key       Initials Effective Dates Name Provider Type Discipline    GLADYS 06/16/21 -  Marilin Ceolho OT Occupational Therapist OT                  OT Recommendation and Plan  Planned Therapy Interventions (OT): activity tolerance training, BADL retraining, functional balance retraining, occupation/activity based interventions, patient/caregiver  education/training, ROM/therapeutic exercise, strengthening exercise, transfer/mobility retraining  Therapy Frequency (OT): daily  Plan of Care Review  Plan of Care Reviewed With: patient, spouse, son  Outcome Evaluation: OT eval completed. Pt presents with generalized weakness, decreased activity tolerance, and dizziness with positional changes impacting ADL's. Pt Dep for donning socks and pajama pants at EOB, CGA for functional transfers, VERDE for grooming tasks seated in chair. IP OT services warranted to progress self-care and safety. Recommend HHOT at discharge.     Time Calculation:   Evaluation Complexity (OT)  Review Occupational Profile/Medical/Therapy History Complexity: expanded/moderate complexity  Assessment, Occupational Performance/Identification of Deficit Complexity: 3-5 performance deficits  Clinical Decision Making Complexity (OT): detailed assessment/moderate complexity  Overall Complexity of Evaluation (OT): moderate complexity     Time Calculation- OT       Row Name 09/14/23 1430             Time Calculation- OT    OT Start Time 1430  -GLADYS      OT Received On 09/14/23  -GLADYS      OT Goal Re-Cert Due Date 09/24/23  -GLADYS         Untimed Charges    OT Eval/Re-eval Minutes 54  -GLADYS         Total Minutes    Untimed Charges Total Minutes 54  -GLADYS       Total Minutes 54  -GLADYS                User Key  (r) = Recorded By, (t) = Taken By, (c) = Cosigned By      Initials Name Provider Type    Marilin Campbell OT Occupational Therapist                  Therapy Charges for Today       Code Description Service Date Service Provider Modifiers Qty    [id number]  OT EVAL MOD COMPLEXITY 4 9/14/2023 Marilin Coelho OT GO 1                 Marilin Coelho OT  9/14/2023

## 2023-09-14 NOTE — PROGRESS NOTES
[facility] Medicine Services  ADMISSION FOLLOW-UP NOTE          Patient admitted after midnight, H&P by my partner performed earlier on today's date reviewed.  Interim findings, labs, and charting also reviewed.        [facility] Hospital Problem List has been managed and updated to include any new diagnoses:  Active Hospital Problems    Diagnosis  POA    **Acute renal failure [N17.9]  Yes    Acute urinary retention [R33.8]  Yes    Hyperkalemia [E87.5]  Yes    Renal mass, right [N28.89]  Yes    Dementia [F03.90]  Yes    Essential hypertension [I10]  Yes    Hyponatremia [E87.1]  Unknown      Resolved Hospital Problems   No resolved problems to display.         ADDITIONAL PLAN:  - detailed assessment and plan from admission reviewed    This is a 89-year-old male patient with a PMH significant for HTN, dementia, recent diagnosis of renal mass who comes to the ED due to urinary retention.  Patient had a recent hospitalization at , presenting with gross hematuria.  He was found to have a large renal mass.  Patient opted to go home with hospice.  Since discharge home he had urinary frequency and ultimately inability to urinate.  Hospice nurse attempted to place Curtis catheter unsuccessfully.  He was sent to the ED.       Acute renal failure  Acute urinary retention   Right renal mass   -Curtis replaced in ED with good output and improvement in SCr, continue.  -Continue IVF.  -Urology has seen, rec'd outpt referral to  to seek treatment.  -Patient was under hospice care however if he is electing to seek treatment he will not be eligible for hospice.  -Continue flomax  -hold home ARB     UTI with leukocytosis  -continue rocephin   -urine cultures pending      Hyperkalemia  -Repeat lokelma.     Dementia  -continue aricept, namenda     Essential hypertension   -hold losartan d/t above     Hyponatremia  --Has corrected 8mmol at this time, will change IVF to d5 1/2 ns to  prevent further overcorrection.  --Serial Na.    Expected Discharge   Expected Discharge Date: 9/22/2023; Expected Discharge Time:      Aleja Dixon II,   09/14/23

## 2023-09-14 NOTE — THERAPY EVALUATION
Patient Name: Logan Miranda  : 1928    MRN: [id number]                              Today's Date: 2023       Admit Date: 2023    Visit Dx:     ICD-10-CM ICD-9-CM   1. Urinary retention  R33.9 788.20   2. Urinary tract infection with hematuria, site unspecified  N39.0 599.0    R31.9 599.70   3. Right renal mass  N28.89 593.9   4. Acute renal failure, unspecified acute renal failure type  N17.9 584.9   5. Impaired functional mobility, balance, gait, and endurance  Z74.09 V49.89     Patient Active Problem List   Diagnosis    Acute renal failure    Acute urinary retention    Hyperkalemia    Renal mass, right    Dementia    Essential hypertension    Hyponatremia     Past Medical History:   Diagnosis Date    Cancer      History reviewed. No pertinent surgical history.   General Information       Row Name 23 142          Physical Therapy Time and Intention    Document Type evaluation  -SD     Mode of Treatment physical therapy  -SD       Row Name 23 1420          General Information    Patient Profile Reviewed yes  -SD     Prior Level of Function independent:;all household mobility;gait;transfer;bed mobility  Until most recently when pt needed assistance with transfers due to pain and weakness  -SD     Existing Precautions/Restrictions fall;orthostatic hypotension  wilkins catheter, hx falls  -SD     Barriers to Rehab medically complex;previous functional deficit  was receiving hospice care at home  -SD       Row Name 23 1420          Living Environment    People in Home spouse  -SD       Row Name 23 1420          Stairs Within Home, Primary    Number of Stairs, Within Home, Primary none  -SD       Row Name 23 1420          Cognition    Orientation Status (Cognition) oriented x 4  -SD       Row Name 23 142          Safety Issues, Functional Mobility    Safety Issues Affecting Function (Mobility) awareness of need for assistance;insight into  deficits/self-awareness;sequencing abilities;safety precaution awareness  -SD     Impairments Affecting Function (Mobility) balance;endurance/activity tolerance;strength  -SD               User Key  (r) = Recorded By, (t) = Taken By, (c) = Cosigned By      Initials Name Provider Type    Yuridia Campbell PT Physical Therapist                   Mobility       Row Name 09/14/23 1521          Bed Mobility    Bed Mobility supine-sit  -SD     Supine-Sit [place] (Bed Mobility) standby assist  -SD     Assistive Device (Bed Mobility) head of bed elevated  -SD     Comment, (Bed Mobility) cues for sequencing, extra effort needed to complete on his own  -SD       Row Name 09/14/23 1521          Transfers    Comment, (Transfers) STS from EOB with CGA, BP dropped from 137/70 sitting, to 78/50 standing. Pt pivoted to recliner with CGA x2, however gait deferred at this time due to orthostasis. RN alerted. Once seated, /75.  -SD       Row Name 09/14/23 1521          Bed-Chair Transfer    Bed-Chair [place] (Transfers) contact guard;2 person assist;verbal cues  -SD     Assistive Device (Bed-Chair Transfers) walker, front-wheeled  -SD       Row Name 09/14/23 1521          Sit-Stand Transfer    Sit-Stand [place] (Transfers) contact guard;1 person assist  -SD     Assistive Device (Sit-Stand Transfers) walker, front-wheeled  -SD       Row Name 09/14/23 1521          Gait/Stairs (Locomotion)    [place] Level (Gait) not tested  -SD               User Key  (r) = Recorded By, (t) = Taken By, (c) = Cosigned By      Initials Name Provider Type    Yuridia Campbell PT Physical Therapist                   Obj/Interventions       Row Name 09/14/23 1523          Range of Motion Comprehensive    General Range of Motion bilateral lower extremity ROM WFL  -SD       Row Name 09/14/23 1523          Strength Comprehensive (MMT)    General Manual Muscle Testing (MMT) Assessment lower extremity strength deficits  identified  -SD     Comment, General Manual Muscle Testing (MMT) Assessment BLE's 4+/5  -SD       Row Name 09/14/23 1523          Balance    Balance Assessment sitting static balance;standing static balance  -SD     Static Sitting Balance independent  -SD     Position, Sitting Balance unsupported;sitting edge of bed  -SD     Static Standing Balance contact guard  -SD     Position/Device Used, Standing Balance supported;walker, rolling  -SD               User Key  (r) = Recorded By, (t) = Taken By, (c) = Cosigned By      Initials Name Provider Type    Yuridia Campbell, PT Physical Therapist                   Goals/Plan       Row Name 09/14/23 1530          Transfer Goal 1 (PT)    Activity/Assistive Device (Transfer Goal 1, PT) sit-to-stand/stand-to-sit;bed-to-chair/chair-to-bed;walker, rolling  -SD     [place] Level/Cues Needed (Transfer Goal 1, PT) modified independence  -SD     Time Frame (Transfer Goal 1, PT) long term goal (LTG);2 weeks  -SD       Row Name 09/14/23 1530          Gait Training Goal 1 (PT)    Activity/Assistive Device (Gait Training Goal 1, PT) gait (walking locomotion);walker, rolling  -SD     [place] Level (Gait Training Goal 1, PT) contact guard required  -SD     Distance (Gait Training Goal 1, PT) 25ft  -SD     Time Frame (Gait Training Goal 1, PT) long term goal (LTG);2 weeks  -SD       Row Name 09/14/23 1530          Therapy Assessment/Plan (PT)    Planned Therapy Interventions (PT) balance training;bed mobility training;gait training;home exercise program;patient/family education;neuromuscular re-education;transfer training;strengthening  -SD               User Key  (r) = Recorded By, (t) = Taken By, (c) = Cosigned By      Initials Name Provider Type    Yuridia Campbell PT Physical Therapist                   Clinical Impression       Row Name 09/14/23 1526          Pain    Pretreatment Pain Rating 0/10 - no pain  -SD     Posttreatment Pain Rating 0/10 - no pain  -SD        Row Name 09/14/23 1526          Plan of Care Review    Plan of Care Reviewed With patient;spouse;son  -SD     Outcome Evaluation Pt presents with orthostatic hypotension, weakness, and difficulty walking and is below baseline level of function for mobility. Pt t/f STS from EOB with CGA, BP dropped from 137/70 sitting, to 78/50 standing. Pt pivoted to recliner with CGA, however gait deferred at this time due to orthostasis. RN alerted. Once seated, /75. Pt and family ed for use of bedside commode at home next to recliner where patient sleeps. If family does not elect hospice resumption at d/c, would benefit from HHPT.  -SD       Row Name 09/14/23 1526          Therapy Assessment/Plan (PT)    Patient/Family Therapy Goals Statement (PT) return home  -SD     Rehab Potential (PT) good, to achieve stated therapy goals  -SD     Criteria for Skilled Interventions Met (PT) yes;skilled treatment is necessary  -SD     Therapy Frequency (PT) daily  -SD     Predicted Duration of Therapy Intervention (PT) 2wks  -SD       Row Name 09/14/23 1526          Vital Signs    Pre Systolic BP Rehab 137  -SD     Pre Treatment Diastolic BP 70  -SD     Intra Systolic BP Rehab 78   -SD     Intra Treatment Diastolic BP 50   -SD     Post Systolic BP Rehab 138  -SD     Post Treatment Diastolic BP 75  -SD     Pretreatment Heart Rate (beats/min) 82  -SD     Intratreatment Heart Rate (beats/min) 100  -SD     Pre SpO2 (%) 95  -SD     O2 Delivery Pre Treatment room air  -SD     Pre Patient Position Sitting  -SD     Intra Patient Position Standing  -SD     Post Patient Position Sitting  -SD       Row Name 09/14/23 1526          Positioning and Restraints    Pre-Treatment Position in bed  -SD     Post Treatment Position chair  -SD     In Chair notified nsg;reclined;call light within reach;encouraged to call for assist;exit alarm on;with family/caregiver;with OT;waffle cushion  -SD               User Key  (r) = Recorded By, (t) = Taken By,  (c) = Cosigned By      Initials Name Provider Type    Yuridia Campbell, PT Physical Therapist                   Outcome Measures       Row Name 09/14/23 1531 09/14/23 0955       How much help from another person do you currently need...    Turning from your back to your side while in flat bed without using bedrails? 4  -SD 3  -DF    Moving from lying on back to sitting on the side of a flat bed without bedrails? 4  -SD 3  -DF    Moving to and from a bed to a chair (including a wheelchair)? 3  -SD 3  -DF    Standing up from a chair using your arms (e.g., wheelchair, bedside chair)? 3  -SD 3  -DF    Climbing 3-5 steps with a railing? 2  -SD 2  -DF    To walk in hospital room? 3  -SD 2  -DF    AM-PAC 6 Clicks Score (PT) 19  -SD 16  -DF    Highest level of mobility 6 --> Walked 10 steps or more  -SD 5 --> Static standing  -DF      Row Name 09/14/23 1531          Functional Assessment    Outcome Measure Options AM-PAC 6 Clicks Basic Mobility (PT)  -SD               User Key  (r) = Recorded By, (t) = Taken By, (c) = Cosigned By      Initials Name Provider Type    Yuridia Campbell, PT Physical Therapist    DF Rita Irby RN Registered Nurse                                 Physical Therapy Education       Title: PT OT SLP Therapies (Done)       Topic: Physical Therapy (Done)       Point: Mobility training (Done)       Learning Progress Summary             Patient Eager, E, VU by SD at 9/14/2023 1532   Family Eager, E, VU by SD at 9/14/2023 1532                         Point: Home exercise program (Done)       Learning Progress Summary             Patient Eager, E, VU by SD at 9/14/2023 1532   Family Eager, E, VU by SD at 9/14/2023 1532                         Point: Body mechanics (Done)       Learning Progress Summary             Patient Eager, E, VU by SD at 9/14/2023 1532   Family Eager, E, VU by SD at 9/14/2023 1532                         Point: Precautions (Done)       Learning Progress Summary              Patient Eager, E, VU by SD at 9/14/2023 1532   Family Eager, E, VU by SD at 9/14/2023 1532                                         User Key       Initials Effective Dates Name Provider Type Discipline    SD 03/13/23 -  Yuridia Pearl, PT Physical Therapist PT                  PT Recommendation and Plan  Planned Therapy Interventions (PT): balance training, bed mobility training, gait training, home exercise program, patient/family education, neuromuscular re-education, transfer training, strengthening  Plan of Care Reviewed With: patient, spouse, son  Outcome Evaluation: Pt presents with orthostatic hypotension, weakness, and difficulty walking and is below baseline level of function for mobility. Pt t/f STS from EOB with CGA, BP dropped from 137/70 sitting, to 78/50 standing. Pt pivoted to recliner with CGA, however gait deferred at this time due to orthostasis. RN alerted. Once seated, /75. Pt and family ed for use of bedside commode at home next to recliner where patient sleeps. If family does not elect hospice resumption at d/c, would benefit from HHPT.     Time Calculation:   PT Evaluation Complexity  History, PT Evaluation Complexity: 3 or more personal factors and/or comorbidities  Examination of Body Systems (PT Eval Complexity): total of 3 or more elements  Clinical Presentation (PT Evaluation Complexity): evolving  Clinical Decision Making (PT Evaluation Complexity): moderate complexity  Overall Complexity (PT Evaluation Complexity): moderate complexity     PT Charges       Row Name 09/14/23 1532             Time Calculation    Start Time 1420  -SD      PT Non-Billable Time (min) 56 min  -SD      PT Received On 09/14/23  -SD      PT Goal Re-Cert Due Date 09/24/23  -SD         Time Calculation- PT    Total Timed Code Minutes- PT 10 minute(s)  -SD         Timed Charges    18850 - PT Therapeutic Activity Minutes 10  -SD         Total Minutes    Timed Charges Total Minutes 10  -SD       Total  Minutes 10  -SD                User Key  (r) = Recorded By, (t) = Taken By, (c) = Cosigned By      Initials Name Provider Type    Yuridia Campbell, PT Physical Therapist                  Therapy Charges for Today       Code Description Service Date Service Provider Modifiers Qty    20284075614  PT EVAL MOD COMPLEXITY 4 9/14/2023 Yuridia Pearl, PT GP 1    [id number]  PT THERAPEUTIC ACT EA 15 MIN 9/14/2023 Yuridia Pearl, PT GP 1            PT G-Codes  Outcome Measure Options: AM-PAC 6 Clicks Basic Mobility (PT)  AM-PAC 6 Clicks Score (PT): 19  PT Discharge Summary  Anticipated Discharge Disposition (PT): home with home health (vs home with hospice)    Yuridia Pearl, CHAU  9/14/2023

## 2023-09-14 NOTE — PROGRESS NOTES
Continued Stay Note  [facility]     Patient Name: Logan Miranda  MRN: [id number]  Today's Date: 9/14/2023    Admit Date: 9/13/2023    Plan: TBD   Discharge Plan       Row Name 09/14/23 1609       Plan    Plan TBD    Plan Comments Patient is a home hospice patient on the [facility] [place] team. Hospice RN and SW visited with patient and his family. They are waiting to talk to the urologist about what possible options they may have for treatment of his renal mass. They relay that they may choose something or aggressive or may stay with hospice. Hospice will follow up with patient and family tomorrow after they have talked over their options. Updated home team and floor nurse. Please call ext [phone] for assist.                   Discharge Codes    No documentation.                 Expected Discharge Date and Time       Expected Discharge Date Expected Discharge Time    Sep 15, 2023               Kirsten Moran RN

## 2023-09-14 NOTE — PLAN OF CARE
Goal Outcome Evaluation:  Plan of Care Reviewed With: patient, spouse, son           Outcome Evaluation: OT eval completed. Pt presents with generalized weakness, decreased activity tolerance, and dizziness with positional changes impacting ADL's. Pt Dep for donning socks and pajama pants at EOB, CGA for functional transfers, VERDE for grooming tasks seated in chair. IP OT services warranted to progress self-care and safety. Recommend HHOT at discharge.      Anticipated Discharge Disposition (OT): home with 24/7 care, home with home health

## 2023-09-14 NOTE — PLAN OF CARE
Goal Outcome Evaluation:  Plan of Care Reviewed With: patient, spouse, son           Outcome Evaluation: Pt presents with orthostatic hypotension, weakness, and difficulty walking and is below baseline level of function for mobility. Pt t/f STS from EOB with CGA, BP dropped from 137/70 sitting, to 78/50 standing. Pt pivoted to recliner with CGA, however gait deferred at this time due to orthostasis. RN alerted. Once seated, /75. Pt and family ed for use of bedside commode at home next to recliner where patient sleeps. If family does not elect hospice resumption at d/c, would benefit from HHPT.      Anticipated Discharge Disposition (PT): home with home health (vs home with hospice)

## 2023-09-14 NOTE — H&P
[facility] Medicine Services  HISTORY AND PHYSICAL    Patient Name: Logan Miranda  : 1928  MRN: [id number]  Primary Care Physician: Nabil Navarro MD  Date of admission: 2023    Subjective   Subjective     Chief Complaint:  Difficulty urinating     HPI:  Logan Miranda is a 89 y.o. male with a past medical history significant for essential hypertension and dementia presents to the ED accompanied by daughter due to difficulty urinating.  Patient was recently hospitalized at [facility] due to gross hematuria.  Imaging showed a large renal mass concerning for cancerous lesion.  Wilkins catheter was placed at that time and patient opted to go home with hospice. Over the past 30 hours patient has not had any urinary output.  He has had significant abdominal pain.  A hospice nurse removed his catheter today however she was unsuccessful at replacing the cathter therefore he was sent to [facility] for further evaluation.  Upon arrival to the ED wilkins cathter was placed and his pain has significantly improved.  Labs obtained revealed creatinine of 5.10.  Patient denies any fever, chills, nausea, vomiting, diarrhea, cough or shortness of air.  Case was discussed with Dr. Thomas whom will see in am.          Review of Systems   Constitutional:  Positive for appetite change. Negative for activity change, chills, diaphoresis, fatigue, fever and unexpected weight change.   HENT: Negative.     Eyes:  Negative for photophobia and visual disturbance.   Respiratory:  Negative for cough and shortness of breath.    Cardiovascular:  Negative for chest pain, palpitations and leg swelling.   Gastrointestinal:  Positive for abdominal distention and abdominal pain. Negative for blood in stool, constipation, diarrhea, nausea and vomiting.   Genitourinary:  Positive for decreased urine volume and difficulty urinating. Negative for flank pain.   Musculoskeletal:  Negative for back pain, neck  pain and neck stiffness.   Skin: Negative.    Neurological: Negative.    Psychiatric/Behavioral: Negative.                Personal History     Past Medical History:   Diagnosis Date    Cancer              History reviewed. No pertinent surgical history.    Family History:  family history includes Hypertension in his father.     Social History:  reports that he has never smoked. He has never used smokeless tobacco. He reports that he does not drink alcohol and does not use drugs.  Social History     Social History Narrative    Not on file       Medications:  HYDROcodone-acetaminophen, aspirin, donepezil, losartan, memantine, minocycline, senna, and tamsulosin    No Known Allergies    Objective   Objective     Vital Signs:   Temp:  [97.4 °F (36.3 °C)-98 °F (36.7 °C)] 98 °F (36.7 °C)  Heart Rate:  [] 100  Resp:  [18-20] 18  BP: ()/() 125/87    Physical Exam  Vitals and nursing note reviewed.   Constitutional:       General: He is not in acute distress.     Appearance: Normal appearance. He is not ill-appearing, toxic-appearing or diaphoretic.   HENT:      Head: Normocephalic and atraumatic.      Nose: Nose normal.      Mouth/Throat:      Mouth: Mucous membranes are dry.      Pharynx: Oropharynx is clear.   Eyes:      Extraocular Movements: Extraocular movements intact.      Conjunctiva/sclera: Conjunctivae normal.      Pupils: Pupils are equal, round, and reactive to light.   Cardiovascular:      Rate and Rhythm: Normal rate and regular rhythm.      Heart sounds: Normal heart sounds.   Pulmonary:      Breath sounds: Normal breath sounds.   Abdominal:      General: Bowel sounds are normal. There is no distension.      Palpations: Abdomen is soft. There is no mass.      Tenderness: There is no abdominal tenderness. There is no right CVA tenderness, left CVA tenderness, guarding or rebound.      Hernia: No hernia is present.   Musculoskeletal:         General: No swelling, tenderness, deformity or signs  of injury. Normal range of motion.      Cervical back: Normal range of motion and neck supple.      Right lower leg: No edema.      Left lower leg: No edema.   Neurological:      General: No focal deficit present.      Mental Status: He is alert and oriented to person, place, and time. Mental status is at baseline.   Psychiatric:         Mood and Affect: Mood normal.         Behavior: Behavior normal.         Thought Content: Thought content normal.          Result Review:  I have personally reviewed the results from the time of this admission to 9/14/2023 01:40 EDT and agree with these findings:  [x]  Laboratory list / accordion  [x]  Microbiology  [x]  Radiology  [x]  EKG/Telemetry   []  Cardiology/Vascular   []  Pathology  []  Old records  []  Other:  Most notable findings include:     LAB RESULTS:      Lab 09/13/23 2053 09/13/23 1949   WBC  --  15.33*   HEMOGLOBIN  --  10.8*   HEMATOCRIT  --  33.4*   PLATELETS  --  321   NEUTROS ABS  --  13.48*   IMMATURE GRANS (ABS)  --  0.07*   LYMPHS ABS  --  0.74   MONOS ABS  --  0.90   EOS ABS  --  0.10   MCV  --  87.0   LACTATE 1.8  --          Lab 09/13/23 2301 09/13/23 1949   SODIUM 126* 125*   POTASSIUM 5.4* 5.8*   CHLORIDE 88* 84*   CO2 22.0 24.0   ANION GAP 16.0* 17.0*   BUN 71* 73*   CREATININE 5.10* 5.70*   EGFR 9.9* 8.6*   GLUCOSE 141* 115*   CALCIUM 9.1 9.4         Lab 09/13/23 1949   TOTAL PROTEIN 6.9   ALBUMIN 3.6   GLOBULIN 3.3   ALT (SGPT) 14   AST (SGOT) 20   BILIRUBIN 0.3   ALK PHOS 145*                     Lab 09/14/23  0018   FIO2 21   CARBOXYHEMOGLOBIN (VENOUS) 1.2     Brief Urine Lab Results  (Last result in the past 365 days)        Color   Clarity   Blood   Leuk Est   Nitrite   Protein   CREAT   Urine HCG        09/13/23 2020 Yellow   Cloudy   Large (3+)   Small (1+)   Negative   100 mg/dL (2+)                 Microbiology Results (last 10 days)       ** No results found for the last 240 hours. **            CT Outside Films    Result Date:  9/13/2023  This procedure was auto-finalized with no dictation required.         Assessment & Plan   Assessment & Plan       Acute renal failure    Acute urinary retention    Hyperkalemia    Renal mass, right    Dementia    Essential hypertension      89 year old male presents to the ED due to abdominal pain/distention with no output from wilkins catheter in 30 hours.     1) Acute renal failure      Acute urinary retention      Right renal mass   -creatinine 5.10  -Started on hydrocodone at discharge, likely etiology of acute urinary retention.  No prior history  -wilkins catheter placed in the ED with significant improvement of symptoms   -continue IVF  -hold nephrotoxic agents  -Urology to see in am   -Patient open to discussing possible treatment options  -consider nephrology if no improvement   -patient is under Hospice care  -strict I &O   -pain control   -continue flomax  -hold home ARB    2) UTI with leukocytosis  -continue rocephin   -urine cultures pending   -UA noted above  -strict I &O     3) Hyperkalemia  -EKG with peaked T waves and question of a fib/flutter.  Repeat EKG sinus rhythm.  Monitor on telemetry.  -s/p lokelma, calcium gluconate, insulin and D50  -repeat potassium trending down  -fingersticks q1 hour for now  -telemetry monitoring   -check mag  -hold home ARB    4) Dementia  -continue aricept, namenda    5) Essential hypertension   -hold losartan d/t above    6) hyponatremia  --Check urine osmolality, serum osmolality, urine sodium  --IV fluids depending on results  --Every 4 hours BMP   --Not to correct more than 8 mmol/L/day        DVT prophylaxis:  scds    CODE STATUS:  DNR/DNI        Expected Discharge  TBD     This note has been completed as part of a split-shared workflow.     Signature: Electronically signed by NURIA Lazaro, 09/14/23, 1:50 AM EDT.    Total time spent: 80 mins  Time spent includes time reviewing chart, face-to-face time, counseling patient/family/caregiver,  ordering medications/tests/procedures, communicating with other health care professionals, documenting clinical information in the electronic health record, and coordination of care.        Attending   Admission Attestation       I have performed an independent face-to-face diagnostic evaluation including performing an independent physical examination.  The documented plan of care above was reviewed and developed with the advanced practice clinician (APC).  I have updated the HPI as appropriate.    Brief HPI    This is a 89-year-old male patient with a PMH significant for HTN, dementia, recent diagnosis of renal mass who comes to the ED due to urinary retention.  Patient had a recent hospitalization at [facility], presenting with gross hematuria.  He was found to have a large renal mass.  Patient opted to go home with hospice.  Since discharge home he had urinary frequency and ultimately inability to urinate.  Hospice nurse attempted to place Curtis catheter unsuccessfully.  He was sent to the ED.    Attending Physical Exam:  Temp:  [97.4 °F (36.3 °C)-98 °F (36.7 °C)] 98 °F (36.7 °C)  Heart Rate:  [] 100  Resp:  [18-20] 18  BP: ()/() 125/87    Constitutional: Awake, alert  Eyes: PERRLA, sclerae anicteric, no conjunctival injection  HENT: NCAT, mucous membranes moist  Neck: Supple, no thyromegaly, no lymphadenopathy, trachea midline  Respiratory: Clear to auscultation bilaterally, nonlabored respirations   Cardiovascular: RRR, no murmurs, rubs, or gallops, palpable pedal pulses bilaterally  Gastrointestinal: Positive bowel sounds, soft, nontender, nondistended  Musculoskeletal: No bilateral ankle edema, no clubbing or cyanosis to extremities  Psychiatric: Appropriate affect, cooperative  Neurologic: Oriented x 3, strength symmetric in all extremities, Cranial Nerves grossly intact to confrontation, speech clear  Skin: No rashes      Assessment and Plan:    See assessment and plan  documented by APC above and updated/edited by me as appropriate.    Pam Koehler, DO  09/14/23

## 2023-09-14 NOTE — PLAN OF CARE
Problem: Adult Inpatient Plan of Care  Goal: Plan of Care Review  Outcome: Ongoing, Progressing  Flowsheets (Taken 9/14/2023 0114)  Plan of Care Reviewed With:   patient   spouse  Goal: Patient-Specific Goal (Individualized)  Outcome: Ongoing, Progressing  Goal: Absence of Hospital-Acquired Illness or Injury  Outcome: Ongoing, Progressing  Goal: Optimal Comfort and Wellbeing  Outcome: Ongoing, Progressing  Goal: Readiness for Transition of Care  Outcome: Ongoing, Progressing   Goal Outcome Evaluation:  Plan of Care Reviewed With: patient, spouse

## 2023-09-14 NOTE — PLAN OF CARE
Problem: Adult Inpatient Plan of Care  Goal: Plan of Care Review  9/14/2023 0150 by Rickey Reed RN  Outcome: Ongoing, Progressing  Flowsheets (Taken 9/14/2023 0150)  Plan of Care Reviewed With: patient  9/14/2023 0114 by Rickey Reed RN  Outcome: Ongoing, Progressing  Flowsheets (Taken 9/14/2023 0114)  Plan of Care Reviewed With:   patient   spouse  Goal: Patient-Specific Goal (Individualized)  9/14/2023 0150 by Rickey Reed RN  Outcome: Ongoing, Progressing  9/14/2023 0114 by Rickey Reed RN  Outcome: Ongoing, Progressing  Goal: Absence of Hospital-Acquired Illness or Injury  9/14/2023 0150 by Rickey Reed RN  Outcome: Ongoing, Progressing  9/14/2023 0114 by Rickey Reed RN  Outcome: Ongoing, Progressing  Goal: Optimal Comfort and Wellbeing  9/14/2023 0150 by Rickey Reed RN  Outcome: Ongoing, Progressing  9/14/2023 0114 by Rickey Reed RN  Outcome: Ongoing, Progressing  Goal: Readiness for Transition of Care  9/14/2023 0150 by Rickey Reed RN  Outcome: Ongoing, Progressing  9/14/2023 0114 by Rickey Reed RN  Outcome: Ongoing, Progressing  Intervention: Mutually Develop Transition Plan  Recent Flowsheet Documentation  Taken 9/14/2023 0128 by Rickey Reed RN  Transportation Anticipated: family or friend will provide  Patient/Family Anticipated Services at Transition: hospice care  Patient/Family Anticipates Transition to: home with help/services  Taken 9/14/2023 0122 by Rickey Reed RN  Equipment Currently Used at Home:   cane, straight   walker, margo   Goal Outcome Evaluation:  Plan of Care Reviewed With: patient

## 2023-09-14 NOTE — CONSULTS
[facility]   HISTORY AND PHYSICAL    Patient Name: Logan Miranda  : 1928  MRN: [id number]  Primary Care Physician:  Nabil Navarro MD  Date of admission: 2023    Subjective   Subjective     Chief Complaint:     HPI:    Logan Miranda is a 89 y.o. male with PMH of HTN and Dementia who presented to [facility] for wilkins catheter placement. Patients daughter and son in law are at the bedside and helped to provide the history. They report patient initially presented to [facility] on  for gross hematuria and abdominal pain. CT AP at that time revealed a large right renal mass. He elected to discharge home with hospice. They report he then returned to [facility] on  for worsening abdominal pain and decreased appetite. Once he was discharged home he was unable to void and a wilkins catheter was placed by Hospice Nurse. The wilkins catheter was not draining and was attempted to be replaced, however hospice nurse was unable to replace catheter and they presented to [facility] ED for further management. A wilkins catheter was placed upon admission and is currently draining yellow urine output.     Family reports he previously saw Dr. Rodriguez years ago for possible BPH. Patient denies family history of bladder or kidney cancer. He is a non smoker.     Labs upon admission remarkable for Cr 5.70, H&H 10.8, 33.4, WBC 15.33. UA with TNTC RBC, 6-12 wbc, no bacteria.CT AP from outside hospital reveals a large right renal mass. Patients family and patient report they are interested in treatment options for right renal mass and had previously elected to proceed with home hospice as they were not provided with options.     He is currently resting in bed and reports intermittent RLQ abdominal pain.     Review of Systems   All systems were reviewed and negative except for: Urinary retention, RLQ abdominal pain    Personal History     Past Medical History:   Diagnosis Date    Cancer         History reviewed. No pertinent surgical history.    Family History: family history includes Hypertension in his father. Otherwise pertinent FHx was reviewed and not pertinent to current issue.    Social History:  reports that he has never smoked. He has never used smokeless tobacco. He reports that he does not drink alcohol and does not use drugs.    Home Medications:  HYDROcodone-acetaminophen, aspirin, donepezil, losartan, memantine, minocycline, senna, and tamsulosin      Allergies:  No Known Allergies    Objective   Objective     Vitals:   Temp:  [97.4 °F (36.3 °C)-98 °F (36.7 °C)] 98 °F (36.7 °C)  Heart Rate:  [] 100  Resp:  [18-20] 18  BP: ()/() 125/87  Physical Exam    Constitutional: Awake in bed, alert    Eyes: PERRLA, sclerae anicteric, no conjunctival injection   HENT: Normocephalic, atraumatic, mucous membranes moist   Neck: Supple, trachea midline   Respiratory:Equal chest rise    Cardiovascular: RRR   Gastrointestinal: Soft   Musculoskeletal: No bilateral ankle edema, no clubbing or cyanosis to extremities   Genitourinary: 2 way wilkins catheter with yellow urine output.    Psychiatric: Appropriate affect, cooperative   Neurologic: Oriented x 3, Cranial Nerves grossly intact, speech clear   Skin: No rashes     Result Review    Result Review:  I have personally reviewed the results from the time of this admission to 9/14/2023 09:05 EDT and agree with these findings:  [x]  Laboratory  [x]  Microbiology  [x]  Radiology  []  EKG/Telemetry   []  Cardiology/Vascular   []  Pathology  [x]  Old records  [x]  Other: Vital signs    Most notable findings include: Large right renal mass. Cr 4.31.     Assessment & Plan   Assessment / Plan     Brief Patient Summary:  Logan Miranda is a 89 y.o. male with large right renal mass and urinary retention who presented to [facility] ED for wilkins catheter placement.     Active Hospital Problems:  Active Hospital Problems    Diagnosis     **Acute renal failure      Acute urinary retention     Hyperkalemia     Renal mass, right     Dementia     Essential hypertension     Hyponatremia      Plan:   -Keep wilkins catheter in place.  -Trend Cr. Renal function improving.   -Patient and patients family are interested in options for Large right renal mass. Discussed with Dr. Thomas who recommends referral to  Urology for further evaluation. Will discuss plan of care with family and patient.    will be available, please call if any questions.     DVT prophylaxis:  Mechanical DVT prophylaxis orders are present.    CODE STATUS:    Medical Intervention Limits: NO intubation (DNI)  Level Of Support Discussed With: Patient  Code Status (Patient has no pulse and is not breathing): No CPR (Do Not Attempt to Resuscitate)  Medical Interventions (Patient has pulse or is breathing): Limited Support    Admission Status:  I believe this patient meets inpatient status.    Electronically signed by NURIA Campbell, 09/14/23, 9:05 AM EDT.

## 2023-09-15 LAB
ANION GAP SERPL CALCULATED.3IONS-SCNC: 8 MMOL/L (ref 5–15)
BUN SERPL-MCNC: 39 MG/DL (ref 8–23)
BUN/CREAT SERPL: 20.4 (ref 7–25)
CALCIUM SPEC-SCNC: 8.5 MG/DL (ref 8.2–9.6)
CHLORIDE SERPL-SCNC: 96 MMOL/L (ref 98–107)
CO2 SERPL-SCNC: 27 MMOL/L (ref 22–29)
CREAT SERPL-MCNC: 1.91 MG/DL (ref 0.76–1.27)
EGFRCR SERPLBLD CKD-EPI 2021: 31.9 ML/MIN/1.73
GLUCOSE SERPL-MCNC: 89 MG/DL (ref 65–99)
POTASSIUM SERPL-SCNC: 4.8 MMOL/L (ref 3.5–5.2)
SODIUM SERPL-SCNC: 131 MMOL/L (ref 136–145)

## 2023-09-15 PROCEDURE — 80048 BASIC METABOLIC PNL TOTAL CA: CPT | Performed by: INTERNAL MEDICINE

## 2023-09-15 PROCEDURE — 84295 ASSAY OF SERUM SODIUM: CPT | Performed by: INTERNAL MEDICINE

## 2023-09-15 PROCEDURE — 99232 SBSQ HOSP IP/OBS MODERATE 35: CPT | Performed by: NURSE PRACTITIONER

## 2023-09-15 PROCEDURE — 25010000002 CEFTRIAXONE PER 250 MG: Performed by: NURSE PRACTITIONER

## 2023-09-15 RX ADMIN — ASPIRIN 81 MG: 81 TABLET, CHEWABLE ORAL at 09:25

## 2023-09-15 RX ADMIN — DONEPEZIL HYDROCHLORIDE 5 MG: 5 TABLET, FILM COATED ORAL at 21:45

## 2023-09-15 RX ADMIN — POLYETHYLENE GLYCOL 3350 17 G: 17 POWDER, FOR SOLUTION ORAL at 12:35

## 2023-09-15 RX ADMIN — SENNOSIDES AND DOCUSATE SODIUM 2 TABLET: 8.6; 5 TABLET ORAL at 21:45

## 2023-09-15 RX ADMIN — MEMANTINE 10 MG: 10 TABLET ORAL at 21:45

## 2023-09-15 RX ADMIN — SENNOSIDES AND DOCUSATE SODIUM 2 TABLET: 8.6; 5 TABLET ORAL at 09:25

## 2023-09-15 RX ADMIN — MEMANTINE 10 MG: 10 TABLET ORAL at 09:25

## 2023-09-15 RX ADMIN — SODIUM CHLORIDE 1000 MG: 900 INJECTION INTRAVENOUS at 21:46

## 2023-09-15 RX ADMIN — TAMSULOSIN HYDROCHLORIDE 0.4 MG: 0.4 CAPSULE ORAL at 09:25

## 2023-09-15 NOTE — NURSING NOTE
Patient is on a [facility] specialty bed. BED #4    At discharge, place in hallway near patient room and notify Charge RN and Transport to have [facility] taken to storage.

## 2023-09-15 NOTE — PROGRESS NOTES
[facility] Medicine Services  PROGRESS NOTE    Patient Name: Logan Miranda  : 1928  MRN: [id number]    Date of Admission: 2023  Primary Care Physician: Nabil Navarro MD    Subjective   Subjective     CC: f/u SÁNCHEZ    HPI: Up in bed with HERB in room. Feels great. Doing much better.      Objective   Objective     Vital Signs:   Temp:  [96.6 °F (35.9 °C)-98.4 °F (36.9 °C)] 97 °F (36.1 °C)  Heart Rate:  [65-91] 91  Resp:  [16-18] 18  BP: (130-173)/(68-84) 158/78     Physical Exam:  Constitutional: No acute distress, awake, alert, up in bed, looks good  HENT: NCAT, mucous membranes moist  Respiratory: Clear to auscultation bilaterally, respiratory effort normal   Cardiovascular: RRR, no murmurs, rubs, or gallops  Gastrointestinal: Positive bowel sounds, soft, nontender, nondistended  : Curtis  Musculoskeletal: No bilateral ankle edema  Psychiatric: Appropriate affect, cooperative  Neurologic: Oriented x 3, strength symmetric in all extremities, Cranial Nerves grossly intact to confrontation, speech clear  Skin: No rashes     Results Reviewed:  LAB RESULTS:      Lab 23   WBC 10.52  --  15.33*   HEMOGLOBIN 9.2*  --  10.8*   HEMATOCRIT 27.8*  --  33.4*   PLATELETS 260  --  321   NEUTROS ABS 8.67*  --  13.48*   IMMATURE GRANS (ABS) 0.03  --  0.07*   LYMPHS ABS 0.97  --  0.74   MONOS ABS 0.71  --  0.90   EOS ABS 0.12  --  0.10   MCV 84.5  --  87.0   LACTATE  --  1.8  --          Lab 09/15/23  0529 09/15/23  0010 23  1816 23  1158 23  0905 23  03323  2301   SODIUM 131* 131* 133* 133* 133* 130* 126*   POTASSIUM 4.8  --   --  4.7 5.4* 5.1 5.4*   CHLORIDE 96*  --   --  95* 96* 93* 88*   CO2 27.0  --   --  24.0 25.0 24.0 22.0   ANION GAP 8.0  --   --  14.0 12.0 13.0 16.0*   BUN 39*  --   --  58* 61* 65* 71*   CREATININE 1.91*  --   --  3.07* 3.20* 4.31* 5.10*   EGFR 31.9*  --   --  18.0* 17.2* 12.0* 9.9*   GLUCOSE  89  --   --  116* 90 102* 141*   CALCIUM 8.5  --   --  9.0 9.2 8.8 9.1   MAGNESIUM  --   --   --   --   --  2.4*  --          Lab 09/13/23 1949   TOTAL PROTEIN 6.9   ALBUMIN 3.6   GLOBULIN 3.3   ALT (SGPT) 14   AST (SGOT) 20   BILIRUBIN 0.3   ALK PHOS 145*                     Lab 09/14/23  0018   FIO2 21   CARBOXYHEMOGLOBIN (VENOUS) 1.2     Brief Urine Lab Results  (Last result in the past 365 days)        Color   Clarity   Blood   Leuk Est   Nitrite   Protein   CREAT   Urine HCG        09/14/23 0350             71.2                 Microbiology Results Abnormal       Procedure Component Value - Date/Time    Urine Culture - Urine, Urine, Catheter [[id number]]  (Normal) Collected: 09/13/23 2106    Lab Status: Final result Specimen: Urine, Catheter Updated: 09/14/23 2216     Urine Culture No growth    Blood Culture - Blood, Arm, Left [[id number]]  (Normal) Collected: 09/13/23 2052    Lab Status: Preliminary result Specimen: Blood from Arm, Left Updated: 09/14/23 2200     Blood Culture No growth at 24 hours    Blood Culture - Blood, Arm, Left [[id number]]  (Normal) Collected: 09/13/23 2053    Lab Status: Preliminary result Specimen: Blood from Arm, Left Updated: 09/14/23 2200     Blood Culture No growth at 24 hours    Eosinophil Smear - Urine, Urine, Clean Catch [[id number]]  (Normal) Collected: 09/14/23 0351    Lab Status: Final result Specimen: Urine, Clean Catch Updated: 09/14/23 0706     Eosinophil Smear 0 % EOS/100 Cells     Narrative:      No eosinophil seen            CT Outside Films    Result Date: 9/13/2023  This procedure was auto-finalized with no dictation required.         Current medications:  Scheduled Meds:aspirin, 81 mg, Oral, Daily  cefTRIAXone, 1,000 mg, Intravenous, Q24H  donepezil, 5 mg, Oral, Nightly  memantine, 10 mg, Oral, BID  senna-docusate sodium, 2 tablet, Oral, BID  tamsulosin, 0.4 mg, Oral, Daily      Continuous Infusions:   PRN Meds:.  senna-docusate sodium **AND** polyethylene glycol  **AND** bisacodyl **AND** bisacodyl    HYDROcodone-acetaminophen    nitroglycerin    Insert Peripheral IV **AND** sodium chloride    Assessment & Plan   Assessment & Plan     Active Hospital Problems    Diagnosis  POA    **Acute renal failure [N17.9]  Yes    Acute urinary retention [R33.8]  Yes    Hyperkalemia [E87.5]  Yes    Renal mass, right [N28.89]  Yes    Dementia [F03.90]  Yes    Essential hypertension [I10]  Yes    Hyponatremia [E87.1]  Unknown      Resolved Hospital Problems   No resolved problems to display.        Brief Hospital Course to date:  Logan Miranda is a 89-year-old male patient with a PMH significant for HTN, dementia, recent diagnosis of renal mass who comes to the ED due to urinary retention.  Patient had a recent hospitalization at , presenting with gross hematuria.  He was found to have a large renal mass.  Patient opted to go home with hospice.  Since discharge home he had urinary frequency and ultimately inability to urinate.  Hospice nurse attempted to place Wilkins catheter unsuccessfully.  He was sent to the ED.        Acute renal failure  Acute urinary retention   Right renal mass   -Wilkins replaced in ED with good output and improvement in SCr, continue wilkins at d/c. Stop IVF.  -Urology has seen, rec'd outpt referral to [facility] to seek treatment - family unsure if they wish to pursue this.  -Patient was under hospice care however if he is electing to seek treatment he will not be eligible for hospice - I discussed this with patient/family. Again they are unclear whether or not to pursue.  -Continue flomax  -Continue hold home ARB     UTI with leukocytosis  -continue rocephin   -urine cultures pending      Hyperkalemia  -Repeat lokelma.     Dementia  -continue aricept, namenda     Essential hypertension   -hold losartan d/t above     Hyponatremia  --Has corrected 8mmol at this time, will change IVF to d5 1/2 ns to prevent further overcorrection.  --Serial  Na.    Expected Discharge Location and Transportation:   Expected Discharge   Expected Discharge Date: 9/15/2023; Expected Discharge Time:      DVT prophylaxis:  Mechanical DVT prophylaxis orders are present.     AM-PAC 6 Clicks Score (PT): 19 (09/14/23 1531)    CODE STATUS:   Code Status and Medical Interventions:   Ordered at: 09/14/23 0153     Medical Intervention Limits:    NO intubation (DNI)     Level Of Support Discussed With:    Patient     Code Status (Patient has no pulse and is not breathing):    No CPR (Do Not Attempt to Resuscitate)     Medical Interventions (Patient has pulse or is breathing):    Limited Support       Aleja Dixon II, DO  09/15/23

## 2023-09-15 NOTE — PROGRESS NOTES
[facility]   Urology Progress Note    Patient Name: Logan Miranda  : 1928  MRN: [id number]  Primary Care Physician:  Nabil Navarro MD  Date of admission: 2023    Subjective   Subjective     Chief Complaint: Urinary retention     HPI:  Patient sitting up eating breakfast. Son in law at bedside. Wilkins catheter with yellow urine output.     Cr improving; now 1.9.   Urine Culture no growth.   Review of Systems   All systems were reviewed and negative except for: urinary retention     Objective   Objective     Vitals:   Temp:  [96.6 °F (35.9 °C)-98.4 °F (36.9 °C)] 96.6 °F (35.9 °C)  Heart Rate:  [65-89] 71  Resp:  [16-18] 18  BP: (115-173)/(68-91) 165/80  Physical Exam    Constitutional: Awake in bed, alert   Eyes: PERRLA, sclerae anicteric, no conjunctival injection   HENT: Normocephalic, atraumatic, mucous membranes moist   Neck: Supple, trachea midline   Respiratory: Equal chest rise, non-labored respirations    Cardiovascular: RRR, palpable radial pulses bilaterally   Gastrointestinal: Soft   Genitourinary: wilkins catheter with yellow urine output    Musculoskeletal: No lower extremity edema bilaterally, no clubbing or cyanosis to extremities   Psychiatric: Appropriate affect, cooperative   Neurologic: Oriented x 3,  Cranial Nerves grossly intact, speech clear   Skin: No rashes     Result Review    Result Review:  I have personally reviewed the results from the time of this admission to 9/15/2023 10:46 EDT and agree with these findings:  [x]  Laboratory  [x]  Microbiology  []  Radiology  []  EKG/Telemetry   []  Cardiology/Vascular   []  Pathology  []  Old records  []  Other:    Most notable findings include: Cr 1.91.     Assessment & Plan   Assessment / Plan     Brief Patient Summary:  Logan Miranda is a 89 y.o. male with large right renal mass and urinary retention who presented to [facility] ED for wilkins catheter placement. Renal function is improving.     Active Hospital Problems:  Active Hospital  Problems    Diagnosis     **Acute renal failure     Acute urinary retention     Hyperkalemia     Renal mass, right     Dementia     Essential hypertension     Hyponatremia        Plan:   -Keep wilkins catheter in place.   -Trend Cr.   -D/w Dr. Thomas who recommends referral to  Urology for right renal mass. Patient and patients family are undecided if they want to proceed with referral. I will place referral pending their decision.    will be available, please call if any questions.       DVT prophylaxis:  Mechanical DVT prophylaxis orders are present.    CODE STATUS:   Medical Intervention Limits: NO intubation (DNI)  Level Of Support Discussed With: Patient  Code Status (Patient has no pulse and is not breathing): No CPR (Do Not Attempt to Resuscitate)  Medical Interventions (Patient has pulse or is breathing): Limited Support    Disposition:  I expect patient to discharge home within 24-48 hours.    Electronically signed by NURIA Campbell, 09/15/23, 10:46 AM EDT.

## 2023-09-15 NOTE — CASE MANAGEMENT/SOCIAL WORK
Continued Stay Note  [facility]     Patient Name: Logan Miranda  MRN: [id number]  Today's Date: 9/15/2023    Admit Date: 9/13/2023    Plan: Home with family   Discharge Plan       Row Name 09/15/23 1511       Plan    Plan Home with family    Patient/Family in Agreement with Plan yes    Plan Comments At this time, case management is following.  If Mr Miranda returns home, he will be a hospice patient.  If he decides to do the referral to , then he will not meet hospice criteria.  I spoke with Mr Miranda and his family at bedside.  At this time, there are no needs prior to discharge.  CM will continue to follow.    Final Discharge Disposition Code 01 - home or self-care                   Discharge Codes    No documentation.                 Expected Discharge Date and Time       Expected Discharge Date Expected Discharge Time    Sep 15, 2023               Shirin Joaquin RN

## 2023-09-15 NOTE — PLAN OF CARE
Problem: Fall Injury Risk  Goal: Absence of Fall and Fall-Related Injury  Outcome: Ongoing, Progressing     Problem: Adult Inpatient Plan of Care  Goal: Plan of Care Review  Outcome: Ongoing, Progressing  Flowsheets (Taken 9/15/2023 0349)  Progress: improving  Goal: Patient-Specific Goal (Individualized)  Outcome: Ongoing, Progressing  Goal: Absence of Hospital-Acquired Illness or Injury  Outcome: Ongoing, Progressing  Intervention: Prevent Skin Injury  Recent Flowsheet Documentation  Taken 9/14/2023 2000 by Rickey Reed RN  Body Position: position changed independently  Goal: Optimal Comfort and Wellbeing  Outcome: Ongoing, Progressing  Goal: Readiness for Transition of Care  Outcome: Ongoing, Progressing   Goal Outcome Evaluation:  Plan of Care Reviewed With: patient        Progress: improving

## 2023-09-15 NOTE — PROGRESS NOTES
Continued Stay Note  [facility]     Patient Name: Logan Miranda  MRN: [id number]  Today's Date: 9/15/2023    Admit Date: 9/13/2023    Plan: TBD   Discharge Plan       Row Name 09/15/23 1207       Plan    Plan Comments GW is a 89yo male home hospice patient. Chart reviewed, visit to bedside. Present for visit are patient, spouse, and son in law. Patient noted sitting in recliner. He is awake, alert to self, pleasantly confused. Rn assists to stand due to patient is relaying his catheter is irritatating him. Patient able to stabilize with walker. Relays no to pain, anxiety, dyspnea. Relays catheter discomfort. Rn actively listened as patient's son in law and spouse discuss plan of waiting to hear if [facility] will recommend working up renal mass. They relay that the patient may discharge home prior to this. Per son in law the hospitalist has recommended physical therapy at home. Family discuss desire to remain with current plan of care in the mean time. At present patient remains a home hospice patient. Rn discussed will report to hospice home team desire for physical therapy and current plan. Care coordianted with China Zambrano.                   Discharge Codes    No documentation.                 Expected Discharge Date and Time       Expected Discharge Date Expected Discharge Time    Sep 15, 2023               Laya Norman RN

## 2023-09-16 ENCOUNTER — APPOINTMENT (OUTPATIENT)
Dept: GENERAL RADIOLOGY | Facility: HOSPITAL | Age: 88
End: 2023-09-16

## 2023-09-16 ENCOUNTER — READMISSION MANAGEMENT (OUTPATIENT)
Dept: CALL CENTER | Facility: HOSPITAL | Age: 88
End: 2023-09-16
Payer: MEDICARE

## 2023-09-16 VITALS
WEIGHT: 162.7 LBS | DIASTOLIC BLOOD PRESSURE: 67 MMHG | RESPIRATION RATE: 18 BRPM | OXYGEN SATURATION: 96 % | HEART RATE: 79 BPM | TEMPERATURE: 97.5 F | SYSTOLIC BLOOD PRESSURE: 144 MMHG | BODY MASS INDEX: 22.04 KG/M2 | HEIGHT: 72 IN

## 2023-09-16 PROBLEM — N28.89 RENAL MASS, RIGHT: Status: RESOLVED | Noted: 2023-09-14 | Resolved: 2023-09-16

## 2023-09-16 LAB
ANION GAP SERPL CALCULATED.3IONS-SCNC: 12 MMOL/L (ref 5–15)
BUN SERPL-MCNC: 28 MG/DL (ref 8–23)
BUN/CREAT SERPL: 20.7 (ref 7–25)
CALCIUM SPEC-SCNC: 9.1 MG/DL (ref 8.2–9.6)
CHLORIDE SERPL-SCNC: 95 MMOL/L (ref 98–107)
CO2 SERPL-SCNC: 24 MMOL/L (ref 22–29)
CREAT SERPL-MCNC: 1.35 MG/DL (ref 0.76–1.27)
EGFRCR SERPLBLD CKD-EPI 2021: 48.3 ML/MIN/1.73
GLUCOSE SERPL-MCNC: 90 MG/DL (ref 65–99)
POTASSIUM SERPL-SCNC: 4.8 MMOL/L (ref 3.5–5.2)
SODIUM SERPL-SCNC: 131 MMOL/L (ref 136–145)

## 2023-09-16 PROCEDURE — 74018 RADEX ABDOMEN 1 VIEW: CPT

## 2023-09-16 PROCEDURE — 80048 BASIC METABOLIC PNL TOTAL CA: CPT | Performed by: INTERNAL MEDICINE

## 2023-09-16 RX ORDER — AMOXICILLIN 250 MG
2 CAPSULE ORAL 2 TIMES DAILY
Qty: 60 TABLET | Refills: 1 | Status: SHIPPED | OUTPATIENT
Start: 2023-09-16

## 2023-09-16 RX ORDER — AMLODIPINE BESYLATE 5 MG/1
5 TABLET ORAL DAILY
Qty: 30 TABLET | Refills: 1 | Status: SHIPPED | OUTPATIENT
Start: 2023-09-16

## 2023-09-16 RX ADMIN — TAMSULOSIN HYDROCHLORIDE 0.4 MG: 0.4 CAPSULE ORAL at 09:00

## 2023-09-16 RX ADMIN — ASPIRIN 81 MG: 81 TABLET, CHEWABLE ORAL at 09:00

## 2023-09-16 RX ADMIN — MEMANTINE 10 MG: 10 TABLET ORAL at 09:00

## 2023-09-16 RX ADMIN — SENNOSIDES AND DOCUSATE SODIUM 2 TABLET: 8.6; 5 TABLET ORAL at 09:00

## 2023-09-16 NOTE — PLAN OF CARE
Problem: Adult Inpatient Plan of Care  Goal: Plan of Care Review  Outcome: Met  Goal: Patient-Specific Goal (Individualized)  Outcome: Met  Goal: Absence of Hospital-Acquired Illness or Injury  Outcome: Met  Intervention: Identify and Manage Fall Risk  Recent Flowsheet Documentation  Taken 9/16/2023 1212 by Sherry Martin RN  Safety Promotion/Fall Prevention:   activity supervised   assistive device/personal items within reach   clutter free environment maintained   room organization consistent   safety round/check completed  Taken 9/16/2023 0854 by Sherry Martin RN  Safety Promotion/Fall Prevention:   activity supervised   assistive device/personal items within reach   clutter free environment maintained   room organization consistent   safety round/check completed  Intervention: Prevent Skin Injury  Recent Flowsheet Documentation  Taken 9/16/2023 1212 by Sherry Martin RN  Body Position: position changed independently  Skin Protection:   adhesive use limited   incontinence pads utilized   tubing/devices free from skin contact   transparent dressing maintained  Taken 9/16/2023 0854 by Sherry Martin RN  Body Position: position changed independently  Skin Protection:   adhesive use limited   incontinence pads utilized   transparent dressing maintained   tubing/devices free from skin contact  Intervention: Prevent and Manage VTE (Venous Thromboembolism) Risk  Recent Flowsheet Documentation  Taken 9/16/2023 1212 by Sherry Martin RN  Activity Management: activity encouraged  Taken 9/16/2023 0854 by Sherry Martin RN  Activity Management: activity encouraged  VTE Prevention/Management: sequential compression devices off  Range of Motion: active ROM (range of motion) encouraged  Intervention: Prevent Infection  Recent Flowsheet Documentation  Taken 9/16/2023 1212 by Sherry Martin RN  Infection Prevention:   environmental surveillance performed   hand hygiene promoted  Taken 9/16/2023 0854 by Veronica  AL Powell  Infection Prevention: environmental surveillance performed  Goal: Optimal Comfort and Wellbeing  Outcome: Met  Intervention: Provide Person-Centered Care  Recent Flowsheet Documentation  Taken 9/16/2023 1212 by Sherry Martin RN  Trust Relationship/Rapport: thoughts/feelings acknowledged  Taken 9/16/2023 0854 by Sherry Martin RN  Trust Relationship/Rapport:   care explained   choices provided   thoughts/feelings acknowledged   questions answered   questions encouraged  Goal: Readiness for Transition of Care  Outcome: Met   Goal Outcome Evaluation:

## 2023-09-16 NOTE — PROGRESS NOTES
Continued Stay Note  [facility]     Patient Name: Logan Miranda  MRN: [id number]  Today's Date: 9/16/2023    Admit Date: 9/13/2023    Plan: TBD   Discharge Plan       Row Name 09/16/23 1649       Plan    Plan TBD    Plan Comments Hospice RN to bedside for courtesy visit. Patient sitting up in bed. Son-in-law Albert at bedside. They relay that they are waiting to talk to the hospitalist and do not have an update on their plan at this time. Will revisit this afternoon and they are agreeable with this. Called nurse Sherry and requested she call this RN after hospitalist sees and there is an update. Made her aware this RN would be revisiting this afternoon.                   Discharge Codes    No documentation.                 Expected Discharge Date and Time       Expected Discharge Date Expected Discharge Time    Sep 16, 2023               Kirsten Moran RN

## 2023-09-16 NOTE — DISCHARGE SUMMARY
[facility] Medicine Services  DISCHARGE SUMMARY    Patient Name: Logan Miranda  : 1928  MRN: [id number]    Date of Admission: 2023  7:45 PM  Date of Discharge:  2023  Primary Care Physician: Nabil Navarro MD    Consults       Date and Time Order Name Status Description    2023  1:53 AM Inpatient Urology Consult Completed             Hospital Course     Presenting Problem: SÁNCHEZ    Active Hospital Problems    Diagnosis  POA    **Acute renal failure [N17.9]  Yes    Acute urinary retention [R33.8]  Yes    Hyperkalemia [E87.5]  Yes    Dementia [F03.90]  Yes    Essential hypertension [I10]  Yes    Hyponatremia [E87.1]  Yes      Resolved Hospital Problems    Diagnosis Date Resolved POA    Renal mass, right [N28.89] 2023 Yes          Hospital Course:  Logan Miranda is a 89-year-old male patient with a PMH significant for HTN, dementia, recent diagnosis of renal mass who comes to the ED due to urinary retention.  Patient had a recent hospitalization at [facility], presenting with gross hematuria.  He was found to have a large renal mass.  Patient opted to go home with hospice.  Since discharge home he had urinary frequency and ultimately inability to urinate.  Hospice nurse attempted to place Curtis catheter unsuccessfully.  He was sent to the ED.        Acute renal failure  Acute urinary retention   Right renal mass   -Curtis replaced in ED with good output and improvement in SCr. He was hydrated with IVF and his creatinine had improved to nearly baseline at d/c.  -Urology has seen, rec'd outpt referral to [facility] to seek treatment - family unsure if they wish to pursue this. Of note he also has an appointment with Dr. Horvath on  which they can also keep if they wish.  -Patient was under hospice care which we can plan to discharge him with. Again they are unsure about plan regarding renal mass as above. They are asking about home PT/OT, I am unsure  if this benefit can be provided under hospice umbrella. This can be addressed as outpt.      Discharge Follow Up Recommendations for outpatient labs/diagnostics:   Dr. Horvath 2 weeks   [facility] as needed.    Day of Discharge     HPI: Up in bed. 5-6 family members in room. Patient says he wants to go home. Family still asking a lot of questions about f/u with urology that I am unable to answer. They also want him to have a bm prior to d/c.    Review of Systems  Gen- No fevers, chills  CV- No chest pain, palpitations  Resp- No cough, dyspnea  GI- No N/V/D, abd pain    Vital Signs:   Temp:  [97.4 °F (36.3 °C)-97.9 °F (36.6 °C)] 97.5 °F (36.4 °C)  Heart Rate:  [] 79  Resp:  [18] 18  BP: (132-193)/(67-95) 144/67      Physical Exam:  Constitutional: No acute distress, awake, alert  HENT: NCAT, mucous membranes moist  Respiratory: Clear to auscultation bilaterally, respiratory effort normal   Cardiovascular: RRR, no murmurs, rubs, or gallops  Gastrointestinal: Positive bowel sounds, soft, nontender, nondistended  : Curtis  Musculoskeletal: No bilateral ankle edema  Psychiatric: Appropriate affect, cooperative  Neurologic: Oriented x 3, strength symmetric in all extremities, Cranial Nerves grossly intact to confrontation, speech clear  Skin: No rashes     Pertinent  and/or Most Recent Results     LAB RESULTS:      Lab 09/14/23  0336 09/13/23 2053 09/13/23 1949   WBC 10.52  --  15.33*   HEMOGLOBIN 9.2*  --  10.8*   HEMATOCRIT 27.8*  --  33.4*   PLATELETS 260  --  321   NEUTROS ABS 8.67*  --  13.48*   IMMATURE GRANS (ABS) 0.03  --  0.07*   LYMPHS ABS 0.97  --  0.74   MONOS ABS 0.71  --  0.90   EOS ABS 0.12  --  0.10   MCV 84.5  --  87.0   LACTATE  --  1.8  --          Lab 09/16/23  0349 09/15/23  1139 09/15/23  0529 09/15/23  0010 09/14/23  1816 09/14/23  1158 09/14/23  0905 09/14/23  0336   SODIUM 131* 131* 131* 131* 133* 133* 133* 130*   POTASSIUM 4.8  --  4.8  --   --  4.7 5.4* 5.1   CHLORIDE 95*  --  96*  --    --  95* 96* 93*   CO2 24.0  --  27.0  --   --  24.0 25.0 24.0   ANION GAP 12.0  --  8.0  --   --  14.0 12.0 13.0   BUN 28*  --  39*  --   --  58* 61* 65*   CREATININE 1.35*  --  1.91*  --   --  3.07* 3.20* 4.31*   EGFR 48.3*  --  31.9*  --   --  18.0* 17.2* 12.0*   GLUCOSE 90  --  89  --   --  116* 90 102*   CALCIUM 9.1  --  8.5  --   --  9.0 9.2 8.8   MAGNESIUM  --   --   --   --   --   --   --  2.4*         Lab 09/13/23 1949   TOTAL PROTEIN 6.9   ALBUMIN 3.6   GLOBULIN 3.3   ALT (SGPT) 14   AST (SGOT) 20   BILIRUBIN 0.3   ALK PHOS 145*                     Lab 09/14/23  0018   FIO2 21   CARBOXYHEMOGLOBIN (VENOUS) 1.2     Brief Urine Lab Results  (Last result in the past 365 days)        Color   Clarity   Blood   Leuk Est   Nitrite   Protein   CREAT   Urine HCG        09/14/23 0350             71.2               Microbiology Results (last 10 days)       Procedure Component Value - Date/Time    Eosinophil Smear - Urine, Urine, Clean Catch [[id number]]  (Normal) Collected: 09/14/23 0351    Lab Status: Final result Specimen: Urine, Clean Catch Updated: 09/14/23 0706     Eosinophil Smear 0 % EOS/100 Cells     Narrative:      No eosinophil seen    Urine Culture - Urine, Urine, Catheter [[id number]]  (Normal) Collected: 09/13/23 2106    Lab Status: Final result Specimen: Urine, Catheter Updated: 09/14/23 2216     Urine Culture No growth    Blood Culture - Blood, Arm, Left [[id number]]  (Normal) Collected: 09/13/23 2053    Lab Status: Preliminary result Specimen: Blood from Arm, Left Updated: 09/15/23 2200     Blood Culture No growth at 2 days    Blood Culture - Blood, Arm, Left [[id number]]  (Normal) Collected: 09/13/23 2052    Lab Status: Preliminary result Specimen: Blood from Arm, Left Updated: 09/15/23 2200     Blood Culture No growth at 2 days            XR Abdomen KUB    Result Date: 9/16/2023  XR ABDOMEN KUB Date of Exam: 9/16/2023 11:10 AM EDT Indication: Constipation Comparison: None available. Findings: There is  mild to moderate increased stool burden. There is no pneumatosis or free air. The lung bases are clear. There are chronic age-related changes involving the bony and vascular structures.     Impression: Mild to moderate increased stool burden throughout the colon. Electronically Signed: Robby Castellano MD  9/16/2023 12:12 PM EDT  Workstation ID: [id number]    CT Outside Films    Result Date: 9/13/2023  This procedure was auto-finalized with no dictation required.                 Plan for Follow-up of Pending Labs/Results:   Pending Labs       Order Current Status    Blood Culture - Blood, Arm, Left Preliminary result    Blood Culture - Blood, Arm, Left Preliminary result          Discharge Details        Discharge Medications        New Medications        Instructions Start Date   amLODIPine 5 MG tablet  Commonly known as: NORVASC   5 mg, Oral, Daily      sennosides-docusate 8.6-50 MG per tablet  Commonly known as: PERICOLACE   2 tablets, Oral, 2 Times Daily             Continue These Medications        Instructions Start Date   aspirin 81 MG chewable tablet   81 mg, Oral, Daily      donepezil 5 MG tablet  Commonly known as: ARICEPT   5 mg, Oral, Nightly      HYDROcodone-acetaminophen 5-325 MG per tablet  Commonly known as: NORCO   2 tablets, Oral, Every 6 Hours PRN      memantine 10 MG tablet  Commonly known as: NAMENDA   10 mg, Oral, 2 Times Daily      senna 8.6 MG tablet  Commonly known as: SENOKOT   1 tablet, Oral, 2 Times Daily      tamsulosin 0.4 MG capsule 24 hr capsule  Commonly known as: FLOMAX   1 capsule, Daily             Stop These Medications      losartan 100 MG tablet  Commonly known as: COZAAR     minocycline 100 MG capsule  Commonly known as: MINOCIN,DYNACIN              No Known Allergies      Discharge Disposition:  Home or Self Care    Diet:  Hospital:  Diet Order   Procedures    Diet: Cardiac Diets; Healthy Heart (2-3 Na+); Texture: Regular Texture (IDDSI 7); Fluid Consistency: Thin (IDDSI 0)        Activity:      Restrictions or Other Recommendations:         CODE STATUS:    Code Status and Medical Interventions:   Ordered at: 09/14/23 0153     Medical Intervention Limits:    NO intubation (DNI)     Level Of Support Discussed With:    Patient     Code Status (Patient has no pulse and is not breathing):    No CPR (Do Not Attempt to Resuscitate)     Medical Interventions (Patient has pulse or is breathing):    Limited Support       No future appointments.    Additional Instructions for the Follow-ups that You Need to Schedule       Discharge Follow-up with PCP   As directed       Currently Documented PCP:    Nabil Navarro MD    PCP Phone Number:    [phone]     Follow Up Details: 1-2 week hospital follow up        Discharge Follow-up with Specified Provider: Dr. Horvath; 2 Weeks   As directed      To: Dr. Horvath   Follow Up: 2 Weeks                      Aleja Dixon II, DO  09/16/23      Time Spent on Discharge:  I spent  33  minutes on this discharge activity which included: face-to-face encounter with the patient, reviewing the data in the system, coordination of the care with the nursing staff as well as consultants, documentation, and entering orders.

## 2023-09-17 NOTE — OUTREACH NOTE
Prep Survey      Flowsheet Row Responses   Jew facility patient discharged from? [place]   Is LACE score < 7 ? No   Eligibility Not Eligible   What are the reasons patient is not eligible? Hospice/Pallative Care   Does the patient have one of the following disease processes/diagnoses(primary or secondary)? Other   Prep survey completed? Yes            Stephanie GARZA - Registered Nurse

## 2023-09-18 LAB
BACTERIA SPEC AEROBE CULT: NORMAL
BACTERIA SPEC AEROBE CULT: NORMAL